# Patient Record
Sex: MALE | Race: WHITE | Employment: FULL TIME | URBAN - METROPOLITAN AREA
[De-identification: names, ages, dates, MRNs, and addresses within clinical notes are randomized per-mention and may not be internally consistent; named-entity substitution may affect disease eponyms.]

---

## 2017-09-27 ENCOUNTER — APPOINTMENT (OUTPATIENT)
Dept: GENERAL RADIOLOGY | Age: 52
End: 2017-09-27
Attending: EMERGENCY MEDICINE
Payer: COMMERCIAL

## 2017-09-27 ENCOUNTER — HOSPITAL ENCOUNTER (EMERGENCY)
Age: 52
Discharge: HOME | End: 2017-09-27
Attending: EMERGENCY MEDICINE
Payer: COMMERCIAL

## 2017-09-27 VITALS
SYSTOLIC BLOOD PRESSURE: 134 MMHG | BODY MASS INDEX: 25.99 KG/M2 | HEIGHT: 69 IN | WEIGHT: 175.49 LBS | RESPIRATION RATE: 15 BRPM | HEART RATE: 86 BPM | DIASTOLIC BLOOD PRESSURE: 97 MMHG | OXYGEN SATURATION: 96 %

## 2017-09-27 DIAGNOSIS — I48.91 ATRIAL FIBRILLATION WITH RVR (HCC): Primary | ICD-10-CM

## 2017-09-27 LAB
ALBUMIN SERPL-MCNC: 4.1 G/DL (ref 3.5–5)
ALBUMIN/GLOB SERPL: 1.1 {RATIO} (ref 1.1–2.2)
ALP SERPL-CCNC: 67 U/L (ref 45–117)
ALT SERPL-CCNC: 46 U/L (ref 12–78)
ANION GAP SERPL CALC-SCNC: 9 MMOL/L (ref 5–15)
AST SERPL-CCNC: 26 U/L (ref 15–37)
ATRIAL RATE: 79 BPM
ATRIAL RATE: 93 BPM
BASOPHILS # BLD: 0.1 K/UL (ref 0–0.1)
BASOPHILS NFR BLD: 1 % (ref 0–1)
BILIRUB SERPL-MCNC: 0.7 MG/DL (ref 0.2–1)
BUN SERPL-MCNC: 14 MG/DL (ref 6–20)
BUN/CREAT SERPL: 16 (ref 12–20)
CALCIUM SERPL-MCNC: 9.6 MG/DL (ref 8.5–10.1)
CALCULATED P AXIS, ECG09: 104 DEGREES
CALCULATED R AXIS, ECG10: -40 DEGREES
CALCULATED R AXIS, ECG10: 107 DEGREES
CALCULATED T AXIS, ECG11: 111 DEGREES
CALCULATED T AXIS, ECG11: 51 DEGREES
CHLORIDE SERPL-SCNC: 103 MMOL/L (ref 97–108)
CK SERPL-CCNC: 112 U/L (ref 39–308)
CO2 SERPL-SCNC: 26 MMOL/L (ref 21–32)
CREAT SERPL-MCNC: 0.86 MG/DL (ref 0.7–1.3)
DIAGNOSIS, 93000: NORMAL
DIAGNOSIS, 93000: NORMAL
EOSINOPHIL # BLD: 0.5 K/UL (ref 0–0.4)
EOSINOPHIL NFR BLD: 5 % (ref 0–7)
ERYTHROCYTE [DISTWIDTH] IN BLOOD BY AUTOMATED COUNT: 12.9 % (ref 11.5–14.5)
GLOBULIN SER CALC-MCNC: 3.7 G/DL (ref 2–4)
GLUCOSE SERPL-MCNC: 118 MG/DL (ref 65–100)
HCT VFR BLD AUTO: 47.3 % (ref 36.6–50.3)
HGB BLD-MCNC: 17.1 G/DL (ref 12.1–17)
INR PPP: 1.1 (ref 0.9–1.1)
LYMPHOCYTES # BLD: 2.9 K/UL (ref 0.8–3.5)
LYMPHOCYTES NFR BLD: 29 % (ref 12–49)
MAGNESIUM SERPL-MCNC: 2.1 MG/DL (ref 1.6–2.4)
MCH RBC QN AUTO: 33 PG (ref 26–34)
MCHC RBC AUTO-ENTMCNC: 36.2 G/DL (ref 30–36.5)
MCV RBC AUTO: 91.3 FL (ref 80–99)
MONOCYTES # BLD: 1.2 K/UL (ref 0–1)
MONOCYTES NFR BLD: 12 % (ref 5–13)
NEUTS SEG # BLD: 5.3 K/UL (ref 1.8–8)
NEUTS SEG NFR BLD: 53 % (ref 32–75)
P-R INTERVAL, ECG05: 172 MS
PLATELET # BLD AUTO: 349 K/UL (ref 150–400)
POTASSIUM SERPL-SCNC: 4.2 MMOL/L (ref 3.5–5.1)
PROT SERPL-MCNC: 7.8 G/DL (ref 6.4–8.2)
PROTHROMBIN TIME: 10.9 SEC (ref 9–11.1)
Q-T INTERVAL, ECG07: 366 MS
Q-T INTERVAL, ECG07: 390 MS
QRS DURATION, ECG06: 102 MS
QRS DURATION, ECG06: 212 MS
QTC CALCULATION (BEZET), ECG08: 442 MS
QTC CALCULATION (BEZET), ECG08: 638 MS
RBC # BLD AUTO: 5.18 M/UL (ref 4.1–5.7)
SODIUM SERPL-SCNC: 138 MMOL/L (ref 136–145)
TROPONIN I SERPL-MCNC: <0.04 NG/ML
VENTRICULAR RATE, ECG03: 161 BPM
VENTRICULAR RATE, ECG03: 88 BPM
WBC # BLD AUTO: 10 K/UL (ref 4.1–11.1)

## 2017-09-27 PROCEDURE — 92960 CARDIOVERSION ELECTRIC EXT: CPT | Performed by: NURSE PRACTITIONER

## 2017-09-27 PROCEDURE — 93005 ELECTROCARDIOGRAM TRACING: CPT

## 2017-09-27 PROCEDURE — 74011250636 HC RX REV CODE- 250/636: Performed by: EMERGENCY MEDICINE

## 2017-09-27 PROCEDURE — 77030018729 HC ELECTRD DEFIB PAD CARD -B

## 2017-09-27 PROCEDURE — 85610 PROTHROMBIN TIME: CPT | Performed by: EMERGENCY MEDICINE

## 2017-09-27 PROCEDURE — 96361 HYDRATE IV INFUSION ADD-ON: CPT

## 2017-09-27 PROCEDURE — 74011000250 HC RX REV CODE- 250: Performed by: EMERGENCY MEDICINE

## 2017-09-27 PROCEDURE — 84484 ASSAY OF TROPONIN QUANT: CPT | Performed by: EMERGENCY MEDICINE

## 2017-09-27 PROCEDURE — 93325 DOPPLER ECHO COLOR FLOW MAPG: CPT

## 2017-09-27 PROCEDURE — 74011250636 HC RX REV CODE- 250/636

## 2017-09-27 PROCEDURE — 96365 THER/PROPH/DIAG IV INF INIT: CPT

## 2017-09-27 PROCEDURE — 36415 COLL VENOUS BLD VENIPUNCTURE: CPT | Performed by: EMERGENCY MEDICINE

## 2017-09-27 PROCEDURE — 96376 TX/PRO/DX INJ SAME DRUG ADON: CPT

## 2017-09-27 PROCEDURE — 71010 XR CHEST PORT: CPT

## 2017-09-27 PROCEDURE — 85025 COMPLETE CBC W/AUTO DIFF WBC: CPT | Performed by: EMERGENCY MEDICINE

## 2017-09-27 PROCEDURE — 80053 COMPREHEN METABOLIC PANEL: CPT | Performed by: EMERGENCY MEDICINE

## 2017-09-27 PROCEDURE — 74011000250 HC RX REV CODE- 250

## 2017-09-27 PROCEDURE — 83735 ASSAY OF MAGNESIUM: CPT | Performed by: EMERGENCY MEDICINE

## 2017-09-27 PROCEDURE — 74011250637 HC RX REV CODE- 250/637: Performed by: INTERNAL MEDICINE

## 2017-09-27 PROCEDURE — 99285 EMERGENCY DEPT VISIT HI MDM: CPT

## 2017-09-27 PROCEDURE — 74011000250 HC RX REV CODE- 250: Performed by: INTERNAL MEDICINE

## 2017-09-27 PROCEDURE — 99152 MOD SED SAME PHYS/QHP 5/>YRS: CPT

## 2017-09-27 PROCEDURE — 82550 ASSAY OF CK (CPK): CPT | Performed by: EMERGENCY MEDICINE

## 2017-09-27 PROCEDURE — 96360 HYDRATION IV INFUSION INIT: CPT

## 2017-09-27 RX ORDER — LIDOCAINE HYDROCHLORIDE 20 MG/ML
15 SOLUTION OROPHARYNGEAL ONCE
Status: COMPLETED | OUTPATIENT
Start: 2017-09-27 | End: 2017-09-27

## 2017-09-27 RX ORDER — DILTIAZEM HYDROCHLORIDE EXTENDED-RELEASE TABLETS 240 MG/1
240 TABLET, EXTENDED RELEASE ORAL DAILY
Qty: 30 TAB | Refills: 11 | Status: SHIPPED | OUTPATIENT
Start: 2017-09-27

## 2017-09-27 RX ORDER — FENTANYL CITRATE 50 UG/ML
INJECTION, SOLUTION INTRAMUSCULAR; INTRAVENOUS
Status: COMPLETED
Start: 2017-09-27 | End: 2017-09-27

## 2017-09-27 RX ORDER — DILTIAZEM HYDROCHLORIDE 5 MG/ML
0.25 INJECTION INTRAVENOUS
Status: COMPLETED | OUTPATIENT
Start: 2017-09-27 | End: 2017-09-27

## 2017-09-27 RX ORDER — FENTANYL CITRATE 50 UG/ML
25-50 INJECTION, SOLUTION INTRAMUSCULAR; INTRAVENOUS
Status: DISCONTINUED | OUTPATIENT
Start: 2017-09-27 | End: 2017-09-27 | Stop reason: HOSPADM

## 2017-09-27 RX ORDER — LIDOCAINE HYDROCHLORIDE 20 MG/ML
SOLUTION OROPHARYNGEAL
Status: COMPLETED
Start: 2017-09-27 | End: 2017-09-27

## 2017-09-27 RX ORDER — MIDAZOLAM HYDROCHLORIDE 1 MG/ML
.5-2 INJECTION, SOLUTION INTRAMUSCULAR; INTRAVENOUS
Status: DISCONTINUED | OUTPATIENT
Start: 2017-09-27 | End: 2017-09-27 | Stop reason: HOSPADM

## 2017-09-27 RX ORDER — MIDAZOLAM HYDROCHLORIDE 1 MG/ML
INJECTION, SOLUTION INTRAMUSCULAR; INTRAVENOUS
Status: COMPLETED
Start: 2017-09-27 | End: 2017-09-27

## 2017-09-27 RX ADMIN — FENTANYL CITRATE 50 MCG: 50 INJECTION, SOLUTION INTRAMUSCULAR; INTRAVENOUS at 13:37

## 2017-09-27 RX ADMIN — DEXTROSE MONOHYDRATE 2.5 MG/HR: 50 INJECTION, SOLUTION INTRAVENOUS at 11:32

## 2017-09-27 RX ADMIN — MIDAZOLAM HYDROCHLORIDE 2 MG: 1 INJECTION, SOLUTION INTRAMUSCULAR; INTRAVENOUS at 13:38

## 2017-09-27 RX ADMIN — BENZOCAINE, BUTAMBEN, AND TETRACAINE HYDROCHLORIDE 1 SPRAY: .028; .004; .004 AEROSOL, SPRAY TOPICAL at 13:22

## 2017-09-27 RX ADMIN — MIDAZOLAM HYDROCHLORIDE 1 MG: 1 INJECTION, SOLUTION INTRAMUSCULAR; INTRAVENOUS at 13:40

## 2017-09-27 RX ADMIN — MIDAZOLAM HYDROCHLORIDE 2 MG: 1 INJECTION, SOLUTION INTRAMUSCULAR; INTRAVENOUS at 13:21

## 2017-09-27 RX ADMIN — LIDOCAINE HYDROCHLORIDE 15 ML: 20 SOLUTION OROPHARYNGEAL at 13:21

## 2017-09-27 RX ADMIN — DILTIAZEM HYDROCHLORIDE 20 MG: 5 INJECTION INTRAVENOUS at 09:26

## 2017-09-27 RX ADMIN — MIDAZOLAM HYDROCHLORIDE 1 MG: 1 INJECTION, SOLUTION INTRAMUSCULAR; INTRAVENOUS at 13:23

## 2017-09-27 RX ADMIN — SODIUM CHLORIDE 1000 ML: 900 INJECTION, SOLUTION INTRAVENOUS at 09:20

## 2017-09-27 RX ADMIN — FENTANYL CITRATE 50 MCG: 50 INJECTION, SOLUTION INTRAMUSCULAR; INTRAVENOUS at 13:20

## 2017-09-27 RX ADMIN — RIVAROXABAN 20 MG: 20 TABLET, FILM COATED ORAL at 14:07

## 2017-09-27 RX ADMIN — LIDOCAINE HYDROCHLORIDE 15 ML: 20 SOLUTION ORAL; TOPICAL at 13:21

## 2017-09-27 RX ADMIN — MIDAZOLAM HYDROCHLORIDE 1 MG: 1 INJECTION, SOLUTION INTRAMUSCULAR; INTRAVENOUS at 13:25

## 2017-09-27 NOTE — PROGRESS NOTES
Successful ROSLYN and cardioversion to SR  Start Dilt ER 240mg daily  Start Xarelto 20mg daily     Follow up with Dr. Suhail Sam 10/17/17

## 2017-09-27 NOTE — DISCHARGE INSTRUCTIONS
[address] [address]  [phone]      ROSLYN and Cardioversion Discharge Instructions    Patient ID:  Dawood Pathak  [id number]  14 y.o.  1965    Date: 9/27/2017    Attending Physician: No admitting provider for patient encounter. Admission Diagnoses: There are no admission diagnoses documented for this encounter. Discharge Diagnoses:   Principal Problem:    Atrial fibrillation with rapid ventricular response ([address] (4/20/2015)      Overview: 7/2015 PVI afib ablation      4/21/15 ROSLYN with successful cardioversion to SR        Discharge Condition: Good    Cardiology Procedures this Admission:  ROSLYN/Cardioversion    Disposition: home    Reference discharge instructions provided by nursing for diet and activity. Signed:  Raegan Lua NP  9/27/2017  3:12 PM      ROSLYN and Cardioversion: After Your Visit     Your Care Instructions    After your ROSLYN and cardioversion, do not eat or drink for 2 hours. You have received a mild anesthesia and numbing medicine in your throat. After these 2 hours, start with sips of water. If you are able to swallow water easily, try something soft (bananna, applesauce). If your throat feels normal, you may resume your regular diet. Do not drive for the rest of the day or as instructed by the physician. Notify your physician if you have any problems after the test: swelling of your tongue or throat, shortness of breath or coughing up blood. Cardioversion is a treatment for an abnormal heartbeat, such as atrial fibrillation. In cardioversion, a brief electric shock is given to the heart to reset its rhythm. The shock comes through metal paddles or patches placed on the outside of your chest. Cardioversion usually restores the heartbeat to normal.    After the procedure, you may have redness, like a sunburn, where the paddles were. Do not drive until the day after a cardioversion.  You can eat and drink when you feel ready to. Your doctor may have you take medicines daily to help the heart beat normally and to prevent blood clots. Your doctor may prescribe medicine before and after cardioversion to help keep your heart in a normal rhythm after the procedure. Instead of electric cardioversion, your doctor may try to convert your heart to a normal rhythm using medicine given through a vein. This is usually done in a clinic or hospital.    Follow-up care is a key part of your treatment and safety. Be sure to make and go to all appointments, and call your doctor if you are having problems. Its also a good idea to know your test results and keep a list of the medicines you take. How can you care for yourself at home? Medicines  Take your medicines exactly as prescribed. Call your doctor if you think you are having a problem with your medicine. You may take some of the following medicines:  Beta-blockers such as propranolol (Inderal), metoprolol (Lopressor), or carvedilol (Coreg). Calcium channel blockers such as diltiazem (Cardizem) or verapamil (Calan). Digoxin (Lanoxin). Antiarrhythmic medicines such as amiodarone (Cordarone), procainamide, or flecainide (Tambocor). You will get more details on the specific medicines your doctor prescribes. If your doctor has given you blood thinners such as warfarin (Coumadin), enoxaparin (Lovenox), or aspirin to prevent blood clots, be sure to: Take your medicine at the same time each day. Talk with your doctor before you make any major changes to your diet or start a weight loss plan. The foods that you eat can affect how well blood thinners work for you. Tell your dentist, pharmacist, and other health professionals that you take blood thinners. Watch for unusual bruising or bleeding, such as blood in urine, maroon or black stools, or bleeding from the nose or gums. Get regular blood tests to check how fast your blood clots, if you are taking Coumadin.   Wear medical alert jewelry that says you take blood thinners. You can buy this at most [employer]. Do not take any vitamins, over-the-counter medicines, or herbal products without talking to your doctor first.    Lifestyle changes  Do not smoke. Smoking increases your risk of stroke and heart attack. If you need help quitting, talk to your doctor about stop-smoking programs and medicines. These can increase your chances of quitting for good. Limit alcohol to 2 drinks a day for men and 1 drink a day for women. Alcohol may interfere with blood thinners. It also increases your risk of falls, which can cause bruising and bleeding. Limit or avoid caffeine (coffee, tea, and cola drinks) and other stimulants (decongestants, over-the-counter cold and flu medicines, and illegal substances) if your doctor says to. They can trigger heart problems. If you are taking blood thinners, avoid sports or activities that could lead to injury. Make your home safe and take other measures to reduce your risk of falling. Always wear a seat belt while in a car. Activity  If your doctor recommends it, get more exercise. Walking is a good choice. Bit by bit, increase the amount you walk every day. Try for 30 minutes on most days of the week. You also may want to swim, bike, or do other activities. When you exercise, watch for signs that your heart is working too hard. You are pushing too hard if you cannot talk while you are exercising. If you become short of breath or dizzy or have chest pain, sit down and rest immediately. If your doctor has not set you up with a cardiac rehabilitation program, talk to him or her about whether that is right for you. Cardiac rehab includes supervised exercise, help with diet and lifestyle changes, and emotional support. It may reduce your risk of future heart problems. Check your pulse regularly. Place two fingers on the artery at the palm side of your wrist in line with your thumb.  If your heartbeat seems uneven or fast, talk to your doctor. When should you call for help? Call 911 anytime you think you may need emergency care. For example, call if:  You have severe trouble breathing. You cough up pink, foamy mucus and you have trouble breathing. You have symptoms of a heart attack such as:  Chest pain or pressure. Sweating. Shortness of breath. Nausea or vomiting. Pain that spreads from the chest to the neck, jaw, or one or both shoulders or arms. Dizziness or lightheadedness. A fast or uneven pulse. After calling 911, chew 1 adult-strength aspirin. Wait for an ambulance. Do not try to drive yourself. You have signs of a stroke. These may include:  Sudden numbness, paralysis, or weakness in your face, arm, or leg, especially on only one side of your body. New problems with walking or balance. Sudden vision changes. Drooling or slurred speech. New problems speaking or understanding simple statements, or feeling confused. A sudden, severe headache that is different from past headaches. You cough up blood. You vomit blood or what looks like coffee grounds. You pass maroon or very bloody stools. You passed out (lost consciousness). Call your doctor now or seek immediate medical care if:  You have new or increased shortness of breath. You are dizzy or lightheaded, or you feel like you may faint. You have sudden weight gain, such as 3 pounds or more in 2 to 3 days. You have increased swelling in your legs, ankles, or feet. You have new bruises or blood spots under your skin. You have a nosebleed. Your gums bleed when you brush your teeth. You have blood in your urine. Your stools are black and tarlike or have streaks of blood. You have vaginal bleeding when you are not having your period, or heavy period bleeding. Watch closely for changes in your health, and be sure to contact your doctor if you have any problems. Where can you learn more? Go to [employer].     Enter N918 in the search box to learn more about \"Cardioversion: After Your Visit. \"    © 2712-3947 [employer]. Care instructions adapted under license by [employer] (which disclaims liability or warranty for this information). This care instruction is for use with your licensed healthcare professional. If you have questions about a medical condition or this instruction, always ask your healthcare professional. Tania Mcknight any warranty or liability for your use of this information.

## 2017-09-27 NOTE — CONSULTS
[id number] [facility] [address] [facility]     Date of  Admission: 9/27/2017  9:02 AM     Admission type:Emergency    Consult for: afib with RVR  Consult by: ED MD     Subjective:     Kevin Bedoya is a 46 y.o. male admitted for afib with RVR. Previous PVI afib ablation in 7/2015, Link implanted. Has not been seen since 2015, and stopped checking ILR 10/2016. Stopped taking Diltiazem and ASA over 1.5 years ago. Had felt well no palpitations until 3-4 weeks ago when he noticed palpitations/afib with exertion, but resolved with rest.  Increased in frequency, until this AM when he awoke with afib which did not resolve. He states overall felt lousy, but denies CP, SOB, dizziness/lightheadedness. ECG afib with RVR, troponin neg. Received IV Dilt 20mg push with slowing of rate to 80-90s, remained in afib. Now started on IV Dilt for rate control. Previous treatment/evaluation includes echocardiogram and PVI afib ablation  . Cardiac risk factors: male gender, hypertension.       Patient Active Problem List    Diagnosis Date Noted    Hypertension     S/P ablation of atrial fibrillation 07/02/2015    Uncontrolled hypertension 04/21/2015    Atrial fibrillation with rapid ventricular response ([address] 04/20/2015      None  Past Medical History:   Diagnosis Date    [facility]fib [facility]     CAD (coronary artery disease)     a fib    Hypertension     S/P ablation of atrial fibrillation 7/2/2015    Uncontrolled hypertension 4/21/2015      Social History     Social History    Marital status:      Spouse name: N/A    Number of children: N/A    Years of education: N/A     Social History Main Topics    Smoking status: Never Smoker    Smokeless tobacco: Never Used    Alcohol use 4.2 oz/week     7 Glasses of wine per week      Comment: several drinks a day    Drug use: No    Sexual activity: Not Asked     Other Topics Concern    None     Social History Narrative     No Known Allergies   Family History   Problem Relation Age of Onset    Family history unknown: Yes      Current Facility-Administered Medications   Medication Dose Route Frequency    dilTIAZem (CARDIZEM) 100 mg in dextrose 5% (MBP/ADV) 100 mL infusion  0-15 mg/hr IntraVENous TITRATE    butamben-tetracaine-benzocaine (CETACAINE) 2 %-2 %-14 % (200 mg/sec) topical spray 1 Spray  1 Spray Topical ONCE    fentaNYL citrate (PF) injection 25-50 mcg  25-50 mcg IntraVENous Multiple    lidocaine (XYLOCAINE) 2 % viscous solution 15 mL  15 mL Mouth/Throat ONCE    midazolam (VERSED) injection 0.5-2 mg  0.5-2 mg IntraVENous Multiple    midazolam (VERSED) 1 mg/mL injection        lidocaine (XYLOCAINE) 2 % viscous solution        fentaNYL citrate (PF) 50 mcg/mL injection         Current Outpatient Prescriptions   Medication Sig    ASPIRIN/ACETAMINOPHEN (GOODY'S BODY PAIN PO) Take 1 Packet by mouth daily as needed for Pain.  MULTIVITAMIN PO Take 1 Tab by mouth daily.         Review of Symptoms:   Constitutional: negative  Eyes: negative   Ears, nose, mouth, throat, and face: negative  Respiratory: negative   Cardiovascular: increase in palpitations last 3-4  weeks  Gastrointestinal: negative  Genitourinary:negative   Musculoskeletal:negative   Neurological: negative   Endocrine: negative          Objective:      Visit Vitals    BP (!) 133/93 (BP 1 Location: Left arm)    Pulse 93    Resp 16    Ht 5' 9\" (1.753 m)    Wt 79.6 kg (175 lb 7.8 oz)    SpO2 94%    BMI 25.91 kg/m2       Physical:   General: WNWD  male in no acute distress  Heart: irr,irr, no m/S3/JVD, no carotid bruits   Lungs: clear   Abdomen: Soft, +BS, NTND   Extremities: LE ana +DP/PT, no edema   Neurologic: Grossly normal    Data Review:   Recent Labs      09/27/17   0911   WBC  10.0   HGB  17.1*   HCT  47.3   PLT  349     Recent Labs      09/27/17   0911   NA  138   K  4.2   CL  103   CO2  26   GLU  118* BUN  14   CREA  0.86   CA  9.6   MG  2.1   ALB  4.1   TBILI  0.7   SGOT  26   ALT  46   INR  1.1       Recent Labs      09/27/17   0911   TROIQ  <0.04       No intake or output data in the 24 hours ending 09/27/17 1319     Cardiographics    Telemetry: afib with RVR  ECG: afib with RVR     CXRAY: no acute changes       Assessment:       Principal Problem:    Atrial fibrillation with rapid ventricular response ([address] (4/20/2015)      Overview: 7/2015 PVI afib ablation      4/21/15 ROSLYN with successful cardioversion to SR         Plan:     Afib with RVR:  Rate control with IV Dilt  Recommend ROSLYN and cardioversion today  [id number] score: 1, start on ASA daily  Transition to PO Dilt XR once converted to SR  Long discussion with patient reference compliance with medications and followup appts  Schedule f/u with Dr. Lico Owens, EP to discuss further management of afib, likely repeat PVI afib ablation    Thank you for consulting DL Quinonez NP       Pt seen and examined in details. Agree with NP A&P. I discussed the risks/benefits/alternatives of the procedure with the patient. The patient understands and agrees to proceed.     Kevyn Fuentes MD

## 2017-09-27 NOTE — ED PROVIDER NOTES
HPI Comments: Kevin Bedoya is a 46 y.o. male with PMhx significant for HTN, CAD, atrial fibrillation, elective cardioversion, and loop recorder placement who presents ambulatory to the ED with cc of sudden onset palpitations and chest pressure beginning [id number]. He indicates he get out of bed to use the restroom at that time and reports the palpitations have persisted ever since. Pt reports SOB at onset of current symptoms, but states this has since improved upon arrival to the ED. He notes intermittent episodes of atrial fibrillation that last ~10 seconds and resolve on their own since his cardioversion in 2015, but indicates he is not currently on any medications for afib with RVR. Pt reports he has felt asymptomatic over the past several days. He reports EtOH use, but denies tobacco and illicit drug use. Pt denies known hx of SVT. Pt specifically denies any chest pain, N/V/D, abdominal pain, fever, chills, and cough. Cardiology: Carli Contreras MD  PCP: None    There are no other complaints, changes or physical findings at this time. The history is provided by the patient. Past Medical History:   Diagnosis Date    A-fib [facility])     CAD (coronary artery disease)     a fib    Hypertension     S/P ablation of atrial fibrillation 7/2/2015    Uncontrolled hypertension 4/21/2015       Past Surgical History:   Procedure Laterality Date    CARDIAC SURG PROCEDURE UNLIST      Cauterized parts of his heart    CARDIOVERSION, ELECTIVE  4/21/2015              Family History:   Problem Relation Age of Onset    Family history unknown: Yes       Social History     Social History    Marital status:      Spouse name: N/A    Number of children: N/A    Years of education: N/A     Occupational History    Not on file.      Social History Main Topics    Smoking status: Never Smoker    Smokeless tobacco: Never Used    Alcohol use 4.2 oz/week     7 Glasses of wine per week      Comment: several drinks a day    Drug use: No    Sexual activity: Not on file     Other Topics Concern    Not on file     Social History Narrative         ALLERGIES: Review of patient's allergies indicates no known allergies. Review of Systems   Constitutional: Negative for chills, fatigue and fever. HENT: Negative for congestion, rhinorrhea and sore throat. Eyes: Negative for pain, discharge and visual disturbance. Respiratory: Positive for shortness of breath. Negative for cough, chest tightness and wheezing. Cardiovascular: Positive for palpitations. Negative for chest pain and leg swelling.        + chest pressure   Gastrointestinal: Negative for abdominal pain, constipation, diarrhea, nausea and vomiting. Genitourinary: Negative for dysuria, frequency and hematuria. Musculoskeletal: Negative for arthralgias, back pain and myalgias. Skin: Negative for rash. Neurological: Negative for dizziness, weakness, light-headedness and headaches. Psychiatric/Behavioral: Negative.       Patient Vitals for the past 12 hrs:   Pulse Resp BP SpO2   09/27/17 1408 82 16 110/79 97 %   09/27/17 1345 83 16 120/80 98 %   09/27/17 1338 (!) 102 16 135/89 97 %   09/27/17 1329 (!) 112 16 135/89 96 %   09/27/17 1314 93 16 (!) 133/93 94 %   09/27/17 1257 (!) 115 18 (!) 136/105 98 %   09/27/17 1245 (!) 135 14 (!) 127/93 96 %   09/27/17 1230 (!) 125 13 (!) 132/93 96 %   09/27/17 1215 (!) 130 14 (!) 138/92 95 %   09/27/17 1200 (!) 135 14 (!) 128/96 94 %   09/27/17 1151 (!) 136 11 144/87 96 %   09/27/17 1137 (!) 136 14 141/90 96 %   09/27/17 1130 (!) 136 16 143/88 96 %   09/27/17 1125 (!) 134 16 131/83 96 %   09/27/17 1108 (!) 128 11 142/90 96 %   09/27/17 1100 (!) 129 17 (!) 133/93 97 %   09/27/17 1055 (!) 129 16 (!) 126/99 96 %   09/27/17 0945 96 12 124/88 97 %   09/27/17 0942 91 11 (!) 128/92 96 %   09/27/17 0930 85 12 117/75 99 %   09/27/17 0925 (!) 140 13 (!) 137/107 97 %   09/27/17 0855 (!) 125 15 119/89 98 %            Physical Exam   Constitutional: He is oriented to person, place, and time. He appears well-developed and well-nourished. No distress. HENT:   Head: Normocephalic and atraumatic. Eyes: EOM are normal. Right eye exhibits no discharge. Left eye exhibits no discharge. No scleral icterus. Neck: Normal range of motion. Neck supple. No tracheal deviation present. Cardiovascular: Normal heart sounds and intact distal pulses. An irregular rhythm present. Tachycardia present. Exam reveals no gallop and no friction rub. No murmur heard. Pulmonary/Chest: Effort normal and breath sounds normal. No respiratory distress. He has no wheezes. He has no rales. Abdominal: Soft. He exhibits no distension. There is no tenderness. Musculoskeletal: Normal range of motion. He exhibits no edema. Lymphadenopathy:     He has no cervical adenopathy. Neurological: He is alert and oriented to person, place, and time. Skin: Skin is warm and dry. No rash noted. Psychiatric: He has a normal mood and affect. Nursing note and vitals reviewed. MDM  Number of Diagnoses or Management Options  Atrial fibrillation with RVR [facility]):   Diagnosis management comments:     Patient presents to ED with atrial fibrillation with RVR. Differential includes arryhthmia, atypical chest pain, stable angina, unstable angina, MI, PE, pleurisy, costochondritis, pneumonia, bronchitis, MSK pain.   Do not suspect dissection.  - CBC, CMP, Mg, Cabrera  - CXR  - Diltiazem IV  - Cardiology consult           Amount and/or Complexity of Data Reviewed  Clinical lab tests: ordered and reviewed  Tests in the radiology section of CPT®: ordered and reviewed  Tests in the medicine section of CPT®: ordered and reviewed  Review and summarize past medical records: yes  Discuss the patient with other providers: yes (Cardiology)  Independent visualization of images, tracings, or specimens: yes    Critical Care  Total time providing critical care: 30-74 minutes    ED Course Procedures    EKG interpretation: (Preliminary)  0850  Rhythm: atrial fib with RVR and LBBB; and irregular. Rate (approx.): 161; Axis: normal; QRS interval: prolonged; ST/T wave: nonspecific ST/T wave changes  Written by NAT Celaya, as dictated by Esperanza López MD    EKG interpretation: (Repeat)  0932  Rhythm: atrial fib; and irregular and LBBB. Rate (approx.): 88; Axis: left axis deviation; QRS interval: normal ; ST/T wave: nonspecific T wave abnormality  Written by NAT Celayaibe, as dictated by Esperanza López MD    PROGRESS NOTE:  9:32 AM  Heart rate is improving after IV diltiazem. Will continue to monitor. Written by NAT Celaya, as dictated by Esperanza López MD.    PROGRESS NOTE:  10:12 AM  Pt's heart rate is increasing to one-teens/one-twenties. Will start diltiazem drip and consult cardiology. Written by NAT Celaya, as dictated by Esperanza López MD.    CONSULT NOTE:   10:31 AM  Esperanza López MD spoke with Evans Ramirez MD   Specialty: cardiology  Discussed pt's hx, disposition, and available diagnostic and imaging results. Reviewed care plans. Consultant agrees with plans as outlined. Dr. Sabino Rodriguez agrees with dilt drip and he will evaluate for admission. Written by NAT Celayaibjesika, as dictated by Esperanza López MD.      CRITICAL CARE NOTE :    12:29 PM    IMPENDING DETERIORATION -Cardiovascular    ASSOCIATED RISK FACTORS - Dysrhythmia    MANAGEMENT- Bedside Assessment and Supervision of Care    INTERPRETATION -  ECG and Blood Pressure    INTERVENTIONS - Diltiazem bolus and drip    CASE REVIEW - Medical Sub-Specialist and Nursing    TREATMENT RESPONSE -Unchanged     PERFORMED BY - Self    NOTES   :    I have spent 45 minutes of critical care time involved in lab review, consultations with specialist, family decision- making, bedside attention and documentation.  During this entire length of time I was immediately available to the patient . Smooth Beard MD    PROGRESS NOTE:  3:36 PM  Cardiology cardioverted pt to normal sinus rhythm. They state he is stable for discharge and have sent his prescriptions to his pharmacy. Written by Nellie Murray ED Scribe, as dictated by Smooth Beard MD.    PROGRESS NOTE:  3:45 PM  Patient is feeling well and denies any complaints. Will discharge with cardiology follow up. Discussed results, prescriptions and follow up plan with patient. Provided customary return to ED instructions. Patient expressed understanding. Dacia Cristobal MD    LABORATORY TESTS:  Recent Results (from the past 12 hour(s))   EKG, 12 LEAD, INITIAL    Collection Time: 09/27/17  8:50 AM   Result Value Ref Range    Ventricular Rate 161 BPM    Atrial Rate 79 BPM    P-R Interval 172 ms    QRS Duration 212 ms    Q-T Interval 390 ms    QTC Calculation (Bezet) 638 ms    Calculated P Axis 104 degrees    Calculated R Axis 107 degrees    Calculated T Axis 111 degrees    Diagnosis       ** Suspect arm lead reversal, interpretation assumes no reversal  Undetermined rhythm  Nonspecific intraventricular block  Lateral infarct , age undetermined  When compared with ECG of 02-JUL-2015 07:27,  Significant changes have occurred     CBC WITH AUTOMATED DIFF    Collection Time: 09/27/17  9:11 AM   Result Value Ref Range    WBC 10.0 4.1 - 11.1 K/uL    RBC 5.18 4.10 - 5.70 M/uL    HGB 17.1 (H) 12.1 - 17.0 g/dL    HCT 47.3 36.6 - 50.3 %    MCV 91.3 80.0 - 99.0 FL    MCH 33.0 26.0 - 34.0 PG    MCHC 36.2 30.0 - 36.5 g/dL    RDW 12.9 11.5 - 14.5 %    PLATELET 285 070 - 551 K/uL    NEUTROPHILS 53 32 - 75 %    LYMPHOCYTES 29 12 - 49 %    MONOCYTES 12 5 - 13 %    EOSINOPHILS 5 0 - 7 %    BASOPHILS 1 0 - 1 %    ABS. NEUTROPHILS 5.3 1.8 - 8.0 K/UL    ABS. LYMPHOCYTES 2.9 0.8 - 3.5 K/UL    ABS. MONOCYTES 1.2 (H) 0.0 - 1.0 K/UL    ABS. EOSINOPHILS 0.5 (H) 0.0 - 0.4 K/UL    ABS.  BASOPHILS 0.1 0.0 - 0.1 K/UL METABOLIC PANEL, COMPREHENSIVE    Collection Time: 09/27/17  9:11 AM   Result Value Ref Range    Sodium 138 136 - 145 mmol/L    Potassium 4.2 3.5 - 5.1 mmol/L    Chloride 103 97 - 108 mmol/L    CO2 26 21 - 32 mmol/L    Anion gap 9 5 - 15 mmol/L    Glucose 118 (H) 65 - 100 mg/dL    BUN 14 6 - 20 MG/DL    Creatinine 0.86 0.70 - 1.30 MG/DL    BUN/Creatinine ratio 16 12 - 20      GFR est AA >60 >60 ml/min/1.73m2    GFR est non-AA >60 >60 ml/min/1.73m2    Calcium 9.6 8.5 - 10.1 MG/DL    Bilirubin, total 0.7 0.2 - 1.0 MG/DL    ALT (SGPT) 46 12 - 78 U/L    AST (SGOT) 26 15 - 37 U/L    Alk.  phosphatase 67 45 - 117 U/L    Protein, total 7.8 6.4 - 8.2 g/dL    Albumin 4.1 3.5 - 5.0 g/dL    Globulin 3.7 2.0 - 4.0 g/dL    A-G Ratio 1.1 1.1 - 2.2     PROTHROMBIN TIME + INR    Collection Time: 09/27/17  9:11 AM   Result Value Ref Range    INR 1.1 0.9 - 1.1      Prothrombin time 10.9 9.0 - 11.1 sec   TROPONIN I    Collection Time: 09/27/17  9:11 AM   Result Value Ref Range    Troponin-I, Qt. <0.04 <0.05 ng/mL   CK W/ REFLX CKMB    Collection Time: 09/27/17  9:11 AM   Result Value Ref Range     39 - 308 U/L   MAGNESIUM    Collection Time: 09/27/17  9:11 AM   Result Value Ref Range    Magnesium 2.1 1.6 - 2.4 mg/dL   EKG, 12 LEAD, SUBSEQUENT    Collection Time: 09/27/17  9:32 AM   Result Value Ref Range    Ventricular Rate 88 BPM    Atrial Rate 93 BPM    QRS Duration 102 ms    Q-T Interval 366 ms    QTC Calculation (Bezet) 442 ms    Calculated R Axis -40 degrees    Calculated T Axis 51 degrees    Diagnosis       Atrial fibrillation with premature ventricular or aberrantly conducted   complexes  Left axis deviation  Minimal voltage criteria for LVH, may be normal variant  Inferior infarct , age undetermined  Anterior infarct , age undetermined  Abnormal ECG  When compared with ECG of 27-SEP-2017 08:50,  MANUAL COMPARISON REQUIRED, DATA IS UNCONFIRMED     EKG, 12 LEAD, INITIAL    Collection Time: 09/27/17  1:52 PM   Result Value Ref Range    Ventricular Rate 85 BPM    Atrial Rate 85 BPM    P-R Interval 180 ms    QRS Duration 116 ms    Q-T Interval 384 ms    QTC Calculation (Bezet) 456 ms    Calculated P Axis 59 degrees    Calculated R Axis -39 degrees    Calculated T Axis 66 degrees    Diagnosis       Normal sinus rhythm  Left axis deviation  Anteroseptal infarct , possibly acute  ** ACUTE MI **  When compared with ECG of 27-SEP-2017 09:32,  MANUAL COMPARISON REQUIRED, DATA IS UNCONFIRMED         IMAGING RESULTS:  CXR Results  (Last 48 hours)               09/27/17 0955  XR CHEST PORT Final result    Impression:  IMPRESSION: No acute abnormality. Narrative:  EXAM:  XR CHEST PORT. INDICATION: Palpitations. COMPARISON: 4/20/2015. FINDINGS:    A portable AP radiograph of the chest was obtained at 0934 hours. The   positioning is lordotic and there is a small monitoring device implanted in the   left chest.   Lines and tubes: The patient is on a cardiac monitor. Lungs: The lungs are clear. Pleura: There is no pneumothorax or pleural effusion. Mediastinum: The cardiac and mediastinal contours and pulmonary vascularity are   normal.   Bones and soft tissues: The bones and soft tissues are grossly within normal   limits. MEDICATIONS GIVEN:  Medications   fentaNYL citrate (PF) injection 25-50 mcg (50 mcg IntraVENous Given 9/27/17 1337)   midazolam (VERSED) injection 0.5-2 mg (1 mg IntraVENous Given 9/27/17 1340)   sodium chloride 0.9 % bolus infusion 1,000 mL (1,000 mL IntraVENous New Bag 9/27/17 0920)   dilTIAZem (CARDIZEM) injection 20 mg (20 mg IntraVENous Given 9/27/17 0926)   butamben-tetracaine-benzocaine (CETACAINE) 2 %-2 %-14 % (200 mg/sec) topical spray 1 Spray (1 Spray Topical Given 9/27/17 1322)   lidocaine (XYLOCAINE) 2 % viscous solution 15 mL (15 mL Mouth/Throat Given 9/27/17 1321)   rivaroxaban (XARELTO) tablet 20 mg (20 mg Oral Given 9/27/17 1407)       IMPRESSION:  1.  Atrial fibrillation with RVR ([facility]        PLAN:  1. Discharge home   Current Discharge Medication List      START taking these medications    Details   rivaroxaban (XARELTO) 20 mg tab tablet Take 1 Tab by mouth daily. Qty: 30 Tab, Refills: 6      dilTIAZem ER (CARDIZEM LA) 240 mg Tb24 tablet Take 1 Tab by mouth daily. Qty: 30 Tab, Refills: 11         CONTINUE these medications which have NOT CHANGED    Details   MULTIVITAMIN PO Take 1 Tab by mouth daily. STOP taking these medications       ASPIRIN/ACETAMINOPHEN (GOODY'S BODY PAIN PO) Comments:   Reason for Stoppin.   Follow-up Information     Follow up With Details Comments [address] On 10/17/2017 at 10:30AM Christopherjesika [address]       None   None (395) Patient stated that they have no PCP      [facility] EMERGENCY DEPT  As needed, If symptoms worsen [address]        Return to ED if worse     DISCHARGE NOTE:  3:50 PM  The patient is ready for discharge. The patients signs, symptoms, diagnosis, and instructions for discharge have been discussed and the pt has conveyed their understanding. The patient is to follow up as recommended with PCP or return to the ER should their symptoms worsen. Plan has been discussed and patient has conveyed their agreement. This note is prepared by Claudia Bower, acting as Scribe for Geraldine Gutierrez MD.    Geraldine Gutierrez MD: The scribe's documentation has been prepared under my direction and personally reviewed by me in its entirety. I confirm that the note above accurately reflects all work, treatment, procedures, and medical decision making performed by me.

## 2017-09-27 NOTE — PROCEDURES
[id number]   [address] [facility]  [address]       Name:  Amish Solitario   MR#:  [id number]   :  1965   Account #:  [de-identified]        Date of Adm:  2017       PROCEDURE: Direct-current cardioversion. ANESTHESIA: IV conscious sedation. INDICATIONS: Atrial fibrillation. BRIEF PROCEDURE NOTE: At the completion of ROSLYN, the patient was   successfully cardioverted to sinus rhythm using 360 joules of   synchronized cardioversion. COMPLICATIONS: None.          Doc MD [place]      [address] [place] [address] / Pamela Wagner   D:  2017   13:43   T:  2017   14:07   Job #:  [id number]

## 2017-09-27 NOTE — PROGRESS NOTES
Pharmacy Clarification of Prior to Admission Medication Regimen     The patient was interviewed regarding clarification of the prior to admission medication regimen, and was questioned regarding use of any other inhalers, topical products, over the counter medications, herbal medications, vitamin products or ophthalmic/nasal/otic medication use. Information Obtained From: Patient    Pertinent Pharmacy Findings:   MULTIVITAMIN PO: Patient stated that he takes this OTC agent at home, but was unaware of the strength/brand. Patient stated 'I do not take this [agent] everyday'.  ASPIRIN/ACETAMINOPHEN (GOODY'S BODY PAIN PO): Patient stated that he takes this OTC agent at home, but was unaware of the strength. PTA medication list was corrected to the following:     Prior to Admission Medications   Prescriptions Last Dose Informant Patient Reported? Taking? ASPIRIN/ACETAMINOPHEN (GOODY'S BODY PAIN PO) 9/25/2017 at Unknown time Self Yes Yes   Sig: Take 1 Packet by mouth daily as needed for Pain. MULTIVITAMIN PO 9/26/2017 at Unknown time Self Yes Yes   Sig: Take 1 Tab by mouth daily.       Facility-Administered Medications: None          Thank you,  Molly Beebe, [facility]  Medication History Pharmacy Technician

## 2017-09-27 NOTE — ED TRIAGE NOTES
Got up this morning and he felt like his heart was pounding. Has not been taking his cardizem as he changed insurance.

## 2017-09-27 NOTE — PROGRESS NOTES
Pt arrived to Non-Invasive Lab. Pt identified with 2 patient identifiers. ROSLYN procedure reviewed with patient and questions answered.   ASA score 2  Mallampati score 2  per MD.

## 2017-09-27 NOTE — PROGRESS NOTES
Pt sedated with 50 mcg IV Fentanyl and 4 mg IV Versed for ROSLYN. Pt tolerated well. Pt given an additional 50 mcg IV Fentanyl and 3 mg IV Versed for Elective Cardioversion. Pt given 1 synch shock at 360 joules. Rhythm converted to sinus.

## 2017-09-27 NOTE — PROGRESS NOTES
TRANSFER - OUT REPORT:    Verbal report given to [facility] on [address] [facility]  being transferred back to [facility] for routine progression of care post ROSLYN/Cardioversion      Report consisted of patients Situation, Background, Assessment and   Recommendations(SBAR). Information from the following report(s) Procedure Summary was reviewed with the receiving nurse. Lines:   Peripheral IV 09/27/17 Right Antecubital (Active)   Site Assessment Clean, dry, & intact 9/27/2017  9:09 AM   Phlebitis Assessment 0 9/27/2017  9:09 AM   Infiltration Assessment 0 9/27/2017  9:09 AM   Dressing Status Clean, dry, & intact 9/27/2017  9:09 AM   Dressing Type Transparent 9/27/2017  9:09 AM   Hub Color/Line Status Green 9/27/2017  9:09 AM       Peripheral IV 09/27/17 Left Forearm (Active)   Site Assessment Clean, dry, & intact 9/27/2017 11:24 AM   Phlebitis Assessment 0 9/27/2017 11:24 AM   Infiltration Assessment 0 9/27/2017 11:24 AM   Dressing Status Clean, dry, & intact 9/27/2017 11:24 AM   Dressing Type Tape;Transparent 9/27/2017 11:24 AM   Hub Color/Line Status Pink 9/27/2017 11:24 AM        Opportunity for questions and clarification was provided.

## 2017-09-28 LAB
ATRIAL RATE: 85 BPM
CALCULATED P AXIS, ECG09: 59 DEGREES
CALCULATED R AXIS, ECG10: -39 DEGREES
CALCULATED T AXIS, ECG11: 66 DEGREES
DIAGNOSIS, 93000: NORMAL
P-R INTERVAL, ECG05: 180 MS
Q-T INTERVAL, ECG07: 384 MS
QRS DURATION, ECG06: 116 MS
QTC CALCULATION (BEZET), ECG08: 456 MS
VENTRICULAR RATE, ECG03: 85 BPM

## 2017-10-17 ENCOUNTER — OFFICE VISIT (OUTPATIENT)
Dept: CARDIOLOGY CLINIC | Age: 52
End: 2017-10-17

## 2017-10-17 VITALS
OXYGEN SATURATION: 98 % | HEART RATE: 84 BPM | HEIGHT: 69 IN | DIASTOLIC BLOOD PRESSURE: 80 MMHG | BODY MASS INDEX: 25.92 KG/M2 | WEIGHT: 175 LBS | SYSTOLIC BLOOD PRESSURE: 130 MMHG | RESPIRATION RATE: 18 BRPM

## 2017-10-17 DIAGNOSIS — I48.91 ATRIAL FIBRILLATION WITH RAPID VENTRICULAR RESPONSE (HCC): ICD-10-CM

## 2017-10-17 DIAGNOSIS — I10 UNCONTROLLED HYPERTENSION: ICD-10-CM

## 2017-10-17 DIAGNOSIS — I44.7 LBBB (LEFT BUNDLE BRANCH BLOCK): ICD-10-CM

## 2017-10-17 DIAGNOSIS — I10 ESSENTIAL HYPERTENSION: Primary | ICD-10-CM

## 2017-10-17 NOTE — PROGRESS NOTES
10/17/2017 10:46 AM      Subjective:     Nima Gonzalez is here for f/u after recent visit to [address] [facility]. Underwent ROSLYN/DCCV for a. Fib with RVR. Feels good since then. He denies chest pain, chest pressure/discomfort, dyspnea, palpitations, irregular heart beats, near-syncope, syncope, fatigue, orthopnea, paroxysmal nocturnal dyspnea, exertional chest pressure/discomfort, claudication, lower extremity edema. On EKG LBBB is noted. Visit Vitals    /80    Pulse 84    Resp 18    Ht 5' 9\" (1.753 m)    Wt 175 lb (79.4 kg)    SpO2 98%    BMI 25.84 kg/m2     Current Outpatient Prescriptions   Medication Sig    ASPIRIN/ACETAMINOPHEN (GOODY'S BODY PAIN PO) Take 1 Packet by mouth daily as needed for Pain.  MULTIVITAMIN PO Take 1 Tab by mouth daily.  rivaroxaban (XARELTO) 20 mg tab tablet Take 1 Tab by mouth daily.  dilTIAZem ER (CARDIZEM LA) 240 mg Tb24 tablet Take 1 Tab by mouth daily. No current facility-administered medications for this visit.           Objective:      Visit Vitals    /80    Pulse 84    Resp 18    Ht 5' 9\" (1.753 m)    Wt 175 lb (79.4 kg)    SpO2 98%    BMI 25.84 kg/m2       Data Review:     EKG: Normal sinus rhythm, LBBB    Reviewed and/or ordered active problem list, medication list tests    Past Medical History:   Diagnosis Date    A-fib ([facility]     CAD (coronary artery disease)     a fib    Hypertension     S/P ablation of atrial fibrillation 7/2/2015    Uncontrolled hypertension 4/21/2015      Past Surgical History:   Procedure Laterality Date    CARDIAC SURG PROCEDURE UNLIST      Cauterized parts of his heart    CARDIOVERSION, ELECTIVE  4/21/2015          No Known Allergies   Family History   Problem Relation Age of Onset    Family history unknown: Yes      Social History     Social History    Marital status:      Spouse name: N/A    Number of children: N/A    Years of education: N/A     Occupational History    Not on file.     Social History Main Topics    Smoking status: Never Smoker    Smokeless tobacco: Never Used    Alcohol use 4.2 oz/week     7 Glasses of wine per week      Comment: several drinks a day    Drug use: No    Sexual activity: Not on file     Other Topics Concern    Not on file     Social History Narrative         Review of Systems     General: Not Present- Anorexia, Chills, Dietary Changes, Fatigue, Fever, Medication Changes, Night Sweats, Weight Gain > 10lbs. and Weight Loss > 10lbs. .  Skin: Not Present- Bruising and Excessive Sweating. HEENT: Not Present- Headache, Visual Loss and Vertigo. Respiratory: Not Present- Cough, Decreased Exercise Tolerance, Difficulty Breathing, Snoring and Wheezing. Cardiovascular: Not Present- Abnormal Blood Pressure, Chest Pain, Claudications, Difficulty Breathing On Exertion, Edema, Fainting / Blacking Out, Irregular Heart Beat, Night Cramps, Orthopnea, Palpitations, Paroxysmal Nocturnal Dyspnea, Rapid Heart Rate, Shortness of Breath and Swelling of Extremities. Gastrointestinal: Not Present- Black, Tarry Stool, Bloody Stool, Diarrhea, Hematemesis, Rectal Bleeding and Vomiting. Musculoskeletal: Not Present- Muscle Pain and Muscle Weakness. Neurological: Not Present- Dizziness. Psychiatric: Not Present- Depression. Endocrine: Not Present- Cold Intolerance, Heat Intolerance and Thyroid Problems. Hematology: Not Present- Abnormal Bleeding, Anemia, Blood Clots and Easy Bruising.       Physical Exam   The physical exam findings are as follows:       General   Mental Status - Alert. General Appearance - Not in acute distress. Chest and Lung Exam   Inspection: Accessory muscles - No use of accessory muscles in breathing. Auscultation:   Breath sounds: - Normal.      Cardiovascular   Inspection: Jugular vein - Bilateral - Inspection Normal.  Palpation/Percussion:   Apical Impulse: - Normal.  Auscultation: Rhythm - Regular. Heart Sounds - S1 WNL and S2 WNL.  No S3 or S4.  Murmurs & Other Heart Sounds: Auscultation of the heart reveals - No Murmurs. Carotid arteries - No Carotid bruit. Peripheral Vascular   Upper Extremity: Inspection - Bilateral - No Cyanotic nailbeds or Digital clubbing. Lower Extremity:   Palpation: Edema - Bilateral - No edema. Assessment:       ICD-10-CM ICD-9-CM    1. Essential hypertension I10 401.9 STRESS TEST LEXISCAN/CARDIOLITE   2. Uncontrolled hypertension I10 401.9 AMB POC EKG ROUTINE W/ 12 LEADS, INTER & REP   3. Atrial fibrillation with rapid ventricular response (HCC) I48.91 427.31    4. LBBB (left bundle branch block) I44.7 426.3 STRESS TEST LEXISCAN/CARDIOLITE       Plan:     1. PAF: s/p ROSLYN and DCCV. Remains in sinus rhythm. Continue cardizem and NOAC. S/p ablation in 2015.   2. HTN: controlled. Monitor BP at home. 3. New LBBB: check leximyoview.

## 2017-10-17 NOTE — MR AVS SNAPSHOT
Visit Information Date & Time Provider Department Dept. Phone Encounter #  
 10/17/2017 10:30 AM Jerman Jiang, [address] [facility] [phone] [id number] Follow-up Instructions Return in about 6 months (around 4/17/2018). Follow-up and Disposition History Upcoming Health Maintenance Date Due Hepatitis C Screening 1965 DTaP/Tdap/Td series (1 - Tdap) 6/25/1986 FOBT Q 1 YEAR AGE 50-75 6/25/2015 INFLUENZA AGE 9 TO ADULT 8/1/2017 Allergies as of 10/17/2017  Review Complete On: 10/17/2017 By: Jerman Jiang MD  
 No Known Allergies Current Immunizations  Never Reviewed No immunizations on file. Not reviewed this visit You Were Diagnosed With   
  
 Codes Comments Essential hypertension    -  Primary ICD-10-CM: I10 
ICD-9-CM: 401.9 Uncontrolled hypertension     ICD-10-CM: I10 
ICD-9-CM: 401.9 Atrial fibrillation with rapid ventricular response (HCC)     ICD-10-CM: I48.91 
ICD-9-CM: 427.31   
 LBBB (left bundle branch block)     ICD-10-CM: I44.7 ICD-9-CM: 426. 3 Vitals BP Pulse Resp Height(growth percentile) Weight(growth percentile) SpO2  
 130/80 84 18 5' 9\" (1.753 m) 175 lb (79.4 kg) 98% BMI Smoking Status 25.84 kg/m2 Never Smoker Vitals History BMI and BSA Data Body Mass Index Body Surface Area  
 25.84 kg/m 2 1.97 m 2 Preferred Pharmacy Pharmacy Name Phone [facility]ACY [facility][id number] - [phone] Your Updated Medication List  
  
   
This list is accurate as of: 10/17/17 10:56 AM.  Always use your most recent med list.  
  
  
  
  
 dilTIAZem  mg Tb24 tablet Commonly known as:  CARDIZEM LA Take 1 Tab by mouth daily. GOODY'S BODY PAIN PO Take 1 Packet by mouth daily as needed for Pain. MULTIVITAMIN PO Take 1 Tab by mouth daily. rivaroxaban 20 mg Tab tablet Commonly known as:  Kanchan Silva Take 1 Tab by mouth daily. We Performed the Following AMB POC EKG ROUTINE W/ 12 LEADS, INTER & REP [90305 CPT(R)] Follow-up Instructions Return in about 6 months (around 4/17/2018). To-Do List   
 10/17/2017 ECG:  STRESS TEST LEXISCAN/CARDIOLITE Introducing [facility]! [employer] introduces "[employer] patient portal. Now you can access parts of your medical record, email your doctor's office, and request medication refills online. 1. In your internet browser, go to [email or web address] 2. Click on the First Time User? Click Here link in the Sign In box. You will see the New Member Sign Up page. 3. Enter your "[employer] Access Code exactly as it appears below. You will not need to use this code after youve completed the sign-up process. If you do not sign up before the expiration date, you must request a new code. · "[employer] Access Code: [email or web address] Expires: 12/26/2017  9:09 AM 
 
4. Enter the last four digits of your Social Security Number (xxxx) and Date of Birth (mm/dd/yyyy) as indicated and click Submit. You will be taken to the next sign-up page. 5. Create a "[employer] ID. This will be your "[employer] login ID and cannot be changed, so think of one that is secure and easy to remember. 6. Create a "[employer] password. You can change your password at any time. 7. Enter your Password Reset Question and Answer. This can be used at a later time if you forget your password. 8. Enter your e-mail address. You will receive e-mail notification when new information is available in [address] 9. Click Sign Up. You can now view and download portions of your medical record. 10. Click the Download Summary menu link to download a portable copy of your medical information.  
 
If you have questions, please visit the Frequently Asked Questions section of the [employer]. Remember, [employer] is NOT to be used for urgent needs. For medical emergencies, dial 911. Now available from your iPhone and Android! Please provide this summary of care documentation to your next provider. Your primary care clinician is listed as NONE. If you have any questions after today's visit, please call [phone].

## 2017-11-09 ENCOUNTER — CLINICAL SUPPORT (OUTPATIENT)
Dept: CARDIOLOGY CLINIC | Age: 52
End: 2017-11-09

## 2017-11-09 DIAGNOSIS — R93.1 ABNORMAL NUCLEAR CARDIAC IMAGING TEST: Primary | ICD-10-CM

## 2017-11-09 DIAGNOSIS — I44.7 LBBB (LEFT BUNDLE BRANCH BLOCK): ICD-10-CM

## 2017-11-09 DIAGNOSIS — I10 ESSENTIAL HYPERTENSION: ICD-10-CM

## 2017-11-13 ENCOUNTER — CLINICAL SUPPORT (OUTPATIENT)
Dept: CARDIOLOGY CLINIC | Age: 52
End: 2017-11-13

## 2017-11-13 DIAGNOSIS — I44.7 LBBB (LEFT BUNDLE BRANCH BLOCK): ICD-10-CM

## 2017-11-13 DIAGNOSIS — I10 UNCONTROLLED HYPERTENSION: Primary | ICD-10-CM

## 2017-11-13 DIAGNOSIS — I10 ESSENTIAL HYPERTENSION: ICD-10-CM

## 2017-11-16 ENCOUNTER — OFFICE VISIT (OUTPATIENT)
Dept: CARDIOLOGY CLINIC | Age: 52
End: 2017-11-16

## 2017-11-16 VITALS
HEIGHT: 69 IN | HEART RATE: 84 BPM | SYSTOLIC BLOOD PRESSURE: 120 MMHG | RESPIRATION RATE: 12 BRPM | WEIGHT: 181.1 LBS | DIASTOLIC BLOOD PRESSURE: 70 MMHG | BODY MASS INDEX: 26.82 KG/M2 | OXYGEN SATURATION: 96 %

## 2017-11-16 DIAGNOSIS — I44.7 LBBB (LEFT BUNDLE BRANCH BLOCK): Primary | ICD-10-CM

## 2017-11-16 DIAGNOSIS — I10 ESSENTIAL HYPERTENSION: ICD-10-CM

## 2017-11-16 DIAGNOSIS — R94.39 ABNORMAL STRESS TEST: ICD-10-CM

## 2017-11-16 DIAGNOSIS — Z86.79: ICD-10-CM

## 2017-11-16 RX ORDER — METOPROLOL TARTRATE 25 MG/1
12.5 TABLET, FILM COATED ORAL 2 TIMES DAILY
Qty: 30 TAB | Refills: 0 | Status: SHIPPED | OUTPATIENT
Start: 2017-11-16 | End: 2018-01-09

## 2017-11-16 NOTE — MR AVS SNAPSHOT
Visit Information Date & Time Provider Department Dept. Phone Encounter #  
 11/16/2017  2:45 PM Nneka Jackson, [address] [place] [address] [facility] [phone] [id number] Upcoming Health Maintenance Date Due Hepatitis C Screening 1965 DTaP/Tdap/Td series (1 - Tdap) 6/25/1986 FOBT Q 1 YEAR AGE 50-75 6/25/2015 Influenza Age 5 to Adult 8/1/2017 Allergies as of 11/16/2017  Review Complete On: 11/16/2017 By: Nneka Jackson MD  
 No Known Allergies Current Immunizations  Never Reviewed No immunizations on file. Not reviewed this visit You Were Diagnosed With   
  
 Codes Comments LBBB (left bundle branch block)    -  Primary ICD-10-CM: I44.7 ICD-9-CM: 426.3 Essential hypertension     ICD-10-CM: I10 
ICD-9-CM: 401.9 Abnormal stress test     ICD-10-CM: R94.39 
ICD-9-CM: 794.39 History of angina, class III     ICD-10-CM: Z86.79 
ICD-9-CM: V12.59 Vitals BP Pulse Resp Height(growth percentile) Weight(growth percentile) SpO2  
 120/70 (BP Patient Position: Sitting) 84 12 5' 9\" (1.753 m) 181 lb 1.6 oz (82.1 kg) 96% BMI Smoking Status 26.74 kg/m2 Never Smoker BMI and BSA Data Body Mass Index Body Surface Area  
 26.74 kg/m 2 2 m 2 Preferred Pharmacy Pharmacy Name Phone [facility]AC[facility][id number] - [id number], [address] [phone] Your Updated Medication List  
  
   
This list is accurate as of: 11/16/17  3:25 PM.  Always use your most recent med list.  
  
  
  
  
 dilTIAZem  mg Tb24 tablet Commonly known as:  CARDIZEM LA Take 1 Tab by mouth daily. GOODY'S BODY PAIN PO Take 1 Packet by mouth daily as needed for Pain.  
  
 metoprolol tartrate 25 mg tablet Commonly known as:  LOPRESSOR Take 0.5 Tabs by mouth two (2) times a day. MULTIVITAMIN PO Take 1 Tab by mouth daily. rivaroxaban 20 mg Tab tablet Commonly known as:  Andrew Elder Take 1 Tab by mouth daily. Prescriptions Sent to Pharmacy Refills  
 metoprolol tartrate (LOPRESSOR) 25 mg tablet 0 Sig: Take 0.5 Tabs by mouth two (2) times a day. Class: Normal  
 Pharmacy: [facility] #: [phone] Route: Oral  
  
We Performed the Following CBC W/O DIFF [56094 CPT(R)] LIPID PANEL [23775 CPT(R)] METABOLIC PANEL, COMPREHENSIVE [33295 CPT(R)] PROTHROMBIN TIME + INR [31469 CPT(R)] To-Do List   
 11/16/2017 Cardiac Services:  CARDIAC CATHETERIZATION Introducing \[facility] [place] [facility]\"" & [facility]! Dear Keven Peck: 
Thank you for requesting a [employer] account. Our records indicate that you already have an active [employer] account. You can access your account anytime at [email or web address] Did you know that you can access your hospital and ER discharge instructions at any time in [employer]? You can also review all of your test results from your hospital stay or ER visit. Additional Information If you have questions, please visit the Frequently Asked Questions section of the [employer] website at [email or web address] Remember, [employer] is NOT to be used for urgent needs. For medical emergencies, dial 911. Now available from your iPhone and Android! Please provide this summary of care documentation to your next provider. Your primary care clinician is listed as NONE. If you have any questions after today's visit, please call [phone].

## 2017-11-16 NOTE — PROGRESS NOTES
11/16/2017 3:23 PM      Subjective:     Alison Escobar is here for f/u visit and to discuss stress results. Has notice left sided chest discomfort at time. He denies dyspnea, orthopnea, paroxysmal nocturnal dyspnea, claudication, lower extremity edema. After last visit had stress test and Echo. Myocardial perfusion imaging is abnormal: there is a large area of infarction in the apical and septal regions. Overall left ventricular systolic function was abnormal: with regional wall motion abnormalities (as noted above).      These test results indicate high likelihood for the presence of angiographically significant coronary artery disease.      Echo:   Left ventricle: Systolic function was normal. Ejection fraction was  estimated in the range of 55 % to 60 %. There were no regional wall motion  abnormalities. Low EF on stress test is most likely gating artifact. Visit Vitals    /70 (BP Patient Position: Sitting)  Comment: lt/rt/lg    Pulse 84    Resp 12    Ht 5' 9\" (1.753 m)    Wt 181 lb 1.6 oz (82.1 kg)    SpO2 96%    BMI 26.74 kg/m2     Current Outpatient Prescriptions   Medication Sig    metoprolol tartrate (LOPRESSOR) 25 mg tablet Take 0.5 Tabs by mouth two (2) times a day.  ASPIRIN/ACETAMINOPHEN (GOODY'S BODY PAIN PO) Take 1 Packet by mouth daily as needed for Pain.  MULTIVITAMIN PO Take 1 Tab by mouth daily.  rivaroxaban (XARELTO) 20 mg tab tablet Take 1 Tab by mouth daily.  dilTIAZem ER (CARDIZEM LA) 240 mg Tb24 tablet Take 1 Tab by mouth daily. No current facility-administered medications for this visit.           Objective:      Visit Vitals    /70 (BP Patient Position: Sitting)    Pulse 84    Resp 12    Ht 5' 9\" (1.753 m)    Wt 181 lb 1.6 oz (82.1 kg)    SpO2 96%    BMI 26.74 kg/m2       Data Review:     Reviewed and/or ordered active problem list, medication list tests    Past Medical History:   Diagnosis Date    [facility]  CAD (coronary artery disease)     a fib    Hypertension     S/P ablation of atrial fibrillation 7/2/2015    Uncontrolled hypertension 4/21/2015      Past Surgical History:   Procedure Laterality Date    CARDIAC SURG PROCEDURE UNLIST      Cauterized parts of his heart    CARDIOVERSION, ELECTIVE  4/21/2015          No Known Allergies   Family History   Problem Relation Age of Onset    Family history unknown: Yes      Social History     Social History    Marital status:      Spouse name: N/A    Number of children: N/A    Years of education: N/A     Occupational History    Not on file. Social History Main Topics    Smoking status: Never Smoker    Smokeless tobacco: Never Used    Alcohol use 4.2 oz/week     7 Glasses of wine per week      Comment: several drinks a day    Drug use: No    Sexual activity: Not on file     Other Topics Concern    Not on file     Social History Narrative         Review of Systems     General: Not Present- Anorexia, Chills, Dietary Changes, Fatigue, Fever, Medication Changes, Night Sweats, Weight Gain > 10lbs. and Weight Loss > 10lbs. .  Skin: Not Present- Bruising and Excessive Sweating. HEENT: Not Present- Headache, Visual Loss and Vertigo. Respiratory: Not Present- Cough, Decreased Exercise Tolerance, Difficulty Breathing, Snoring and Wheezing. Cardiovascular: Not Present- Abnormal Blood Pressure, Claudications, Difficulty Breathing On Exertion, Edema, Fainting / Blacking Out, Irregular Heart Beat, Night Cramps, Orthopnea, Palpitations, Paroxysmal Nocturnal Dyspnea, Rapid Heart Rate, Shortness of Breath and Swelling of Extremities. Gastrointestinal: Not Present- Black, Tarry Stool, Bloody Stool, Diarrhea, Hematemesis, Rectal Bleeding and Vomiting. Musculoskeletal: Not Present- Muscle Pain and Muscle Weakness. Neurological: Not Present- Dizziness. Psychiatric: Not Present- Depression.   Endocrine: Not Present- Cold Intolerance, Heat Intolerance and Thyroid Problems. Hematology: Not Present- Abnormal Bleeding, Anemia, Blood Clots and Easy Bruising.       Physical Exam   The physical exam findings are as follows:       General   Mental Status - Alert. General Appearance - Not in acute distress. Chest and Lung Exam   Inspection: Accessory muscles - No use of accessory muscles in breathing. Auscultation:   Breath sounds: - Normal.      Cardiovascular   Inspection: Jugular vein - Bilateral - Inspection Normal.  Palpation/Percussion:   Apical Impulse: - Normal.  Auscultation: Rhythm - Regular. Heart Sounds - S1 WNL and S2 WNL. No S3 or S4. Murmurs & Other Heart Sounds: Auscultation of the heart reveals - No Murmurs. Carotid arteries - No Carotid bruit. Peripheral Vascular   Upper Extremity: Inspection - Bilateral - No Cyanotic nailbeds or Digital clubbing. Lower Extremity:   Palpation: Edema - Bilateral - No edema. Assessment:       ICD-10-CM ICD-9-CM    1. LBBB (left bundle branch block) I44.7 426.3 LIPID PANEL      CBC W/O DIFF      METABOLIC PANEL, COMPREHENSIVE      PROTHROMBIN TIME + INR      CARDIAC CATHETERIZATION   2. Essential hypertension I10 401.9 LIPID PANEL      CBC W/O DIFF      METABOLIC PANEL, COMPREHENSIVE      PROTHROMBIN TIME + INR      CARDIAC CATHETERIZATION   3. Abnormal stress test R94.39 794.39 LIPID PANEL      CBC W/O DIFF      METABOLIC PANEL, COMPREHENSIVE      PROTHROMBIN TIME + INR      CARDIAC CATHETERIZATION   4. History of angina, class III Z86.79 V12.59 LIPID PANEL      CBC W/O DIFF      METABOLIC PANEL, COMPREHENSIVE      PROTHROMBIN TIME + INR      CARDIAC CATHETERIZATION       Plan:     1. Abnormal stress test: perfusion abnormality most likely due to LBBB, however c/o left sided chest pain off and on. Add BB. Check FLP. Taking aspirin. Option of medical treatment vs cardiac cath d/w pt. Will proceed with cardiac cath, since at times having rest chest discomfort. 1. PAF: s/p ROSLYN and DCCV.  Remains in sinus rhythm. Continue cardizem and NOAC. S/p ablation in 2015.   2. HTN: controlled. 4. Check FLP.

## 2017-11-16 NOTE — PROGRESS NOTES
1. Have you been to the ER, urgent care clinic since your last visit? Hospitalized since your last visit? No    2. Have you seen or consulted any other health care providers outside of the [address] [place] [address] since your last visit? Include any pap smears or colon screening. No     Patient has no cardiac complaints here for review of recent testing.

## 2017-11-27 ENCOUNTER — TELEPHONE (OUTPATIENT)
Dept: CARDIOLOGY CLINIC | Age: 52
End: 2017-11-27

## 2017-11-27 NOTE — TELEPHONE ENCOUNTER
Called pt and left message in regards to his lab work and to call me back. Looked in chart and saw that pt had his labs done. Printed off and will have  review for his upcoming cath.

## 2017-11-28 LAB
ALBUMIN SERPL-MCNC: 4.6 G/DL (ref 3.5–5.5)
ALBUMIN/GLOB SERPL: 1.8 {RATIO} (ref 1.2–2.2)
ALP SERPL-CCNC: 54 IU/L (ref 39–117)
ALT SERPL-CCNC: 28 IU/L (ref 0–44)
AST SERPL-CCNC: 22 IU/L (ref 0–40)
BILIRUB SERPL-MCNC: 0.5 MG/DL (ref 0–1.2)
BUN SERPL-MCNC: 17 MG/DL (ref 6–24)
BUN/CREAT SERPL: 19 (ref 9–20)
CALCIUM SERPL-MCNC: 9.6 MG/DL (ref 8.7–10.2)
CHLORIDE SERPL-SCNC: 99 MMOL/L (ref 96–106)
CHOLEST SERPL-MCNC: 260 MG/DL (ref 100–199)
CO2 SERPL-SCNC: 26 MMOL/L (ref 18–29)
CREAT SERPL-MCNC: 0.9 MG/DL (ref 0.76–1.27)
ERYTHROCYTE [DISTWIDTH] IN BLOOD BY AUTOMATED COUNT: 13.3 % (ref 12.3–15.4)
GFR SERPLBLD CREATININE-BSD FMLA CKD-EPI: 113 ML/MIN/1.73
GFR SERPLBLD CREATININE-BSD FMLA CKD-EPI: 98 ML/MIN/1.73
GLOBULIN SER CALC-MCNC: 2.5 G/DL (ref 1.5–4.5)
GLUCOSE SERPL-MCNC: 106 MG/DL (ref 65–99)
HCT VFR BLD AUTO: 45.4 % (ref 37.5–51)
HDLC SERPL-MCNC: 63 MG/DL
HGB BLD-MCNC: 15.6 G/DL (ref 12.6–17.7)
INR PPP: 1.1 (ref 0.8–1.2)
INTERPRETATION, 910389: NORMAL
LDLC SERPL CALC-MCNC: 181 MG/DL (ref 0–99)
MCH RBC QN AUTO: 31.5 PG (ref 26.6–33)
MCHC RBC AUTO-ENTMCNC: 34.4 G/DL (ref 31.5–35.7)
MCV RBC AUTO: 92 FL (ref 79–97)
PLATELET # BLD AUTO: 331 X10E3/UL (ref 150–379)
POTASSIUM SERPL-SCNC: 4.5 MMOL/L (ref 3.5–5.2)
PROT SERPL-MCNC: 7.1 G/DL (ref 6–8.5)
PROTHROMBIN TIME: 11.9 SEC (ref 9.1–12)
RBC # BLD AUTO: 4.96 X10E6/UL (ref 4.14–5.8)
SODIUM SERPL-SCNC: 140 MMOL/L (ref 134–144)
TRIGL SERPL-MCNC: 81 MG/DL (ref 0–149)
VLDLC SERPL CALC-MCNC: 16 MG/DL (ref 5–40)
WBC # BLD AUTO: 8 X10E3/UL (ref 3.4–10.8)

## 2017-11-29 ENCOUNTER — HOSPITAL ENCOUNTER (OUTPATIENT)
Dept: CARDIAC CATH/INVASIVE PROCEDURES | Age: 52
Discharge: HOME | End: 2017-11-29
Attending: INTERNAL MEDICINE | Admitting: INTERNAL MEDICINE
Payer: COMMERCIAL

## 2017-11-29 VITALS
SYSTOLIC BLOOD PRESSURE: 126 MMHG | WEIGHT: 170 LBS | HEART RATE: 74 BPM | HEIGHT: 69 IN | DIASTOLIC BLOOD PRESSURE: 106 MMHG | RESPIRATION RATE: 18 BRPM | TEMPERATURE: 97.7 F | OXYGEN SATURATION: 98 % | BODY MASS INDEX: 25.18 KG/M2

## 2017-11-29 DIAGNOSIS — Z86.79: ICD-10-CM

## 2017-11-29 DIAGNOSIS — R94.39 ABNORMAL STRESS TEST: ICD-10-CM

## 2017-11-29 DIAGNOSIS — I10 ESSENTIAL HYPERTENSION: ICD-10-CM

## 2017-11-29 DIAGNOSIS — I44.7 LBBB (LEFT BUNDLE BRANCH BLOCK): ICD-10-CM

## 2017-11-29 PROCEDURE — C1894 INTRO/SHEATH, NON-LASER: HCPCS

## 2017-11-29 PROCEDURE — 74011250636 HC RX REV CODE- 250/636

## 2017-11-29 PROCEDURE — C1769 GUIDE WIRE: HCPCS

## 2017-11-29 PROCEDURE — 74011636320 HC RX REV CODE- 636/320

## 2017-11-29 PROCEDURE — 77030010221 HC SPLNT WR POS TELE -B

## 2017-11-29 PROCEDURE — 74011000250 HC RX REV CODE- 250

## 2017-11-29 PROCEDURE — 77030008543 HC TBNG MON PRSS MRTM -A

## 2017-11-29 PROCEDURE — 93458 L HRT ARTERY/VENTRICLE ANGIO: CPT

## 2017-11-29 PROCEDURE — 77030019698 HC SYR ANGI MDLON MRTM -A

## 2017-11-29 PROCEDURE — 77030019569 HC BND COMPR RAD TERU -B

## 2017-11-29 PROCEDURE — 77030015766

## 2017-11-29 PROCEDURE — 74011250636 HC RX REV CODE- 250/636: Performed by: INTERNAL MEDICINE

## 2017-11-29 PROCEDURE — 77030004549 HC CATH ANGI DX PRF MRTM -A

## 2017-11-29 RX ORDER — VERAPAMIL HYDROCHLORIDE 2.5 MG/ML
2.5 INJECTION, SOLUTION INTRAVENOUS ONCE
Status: COMPLETED | OUTPATIENT
Start: 2017-11-29 | End: 2017-11-29

## 2017-11-29 RX ORDER — SODIUM CHLORIDE 9 MG/ML
75 INJECTION, SOLUTION INTRAVENOUS CONTINUOUS
Status: CANCELLED | OUTPATIENT
Start: 2017-11-29 | End: 2017-11-30

## 2017-11-29 RX ORDER — HEPARIN SODIUM 200 [USP'U]/100ML
500 INJECTION, SOLUTION INTRAVENOUS ONCE
Status: COMPLETED | OUTPATIENT
Start: 2017-11-29 | End: 2017-11-29

## 2017-11-29 RX ORDER — HEPARIN SODIUM 200 [USP'U]/100ML
INJECTION, SOLUTION INTRAVENOUS
Status: COMPLETED
Start: 2017-11-29 | End: 2017-11-29

## 2017-11-29 RX ORDER — HEPARIN SODIUM 1000 [USP'U]/ML
1000-10000 INJECTION, SOLUTION INTRAVENOUS; SUBCUTANEOUS
Status: DISCONTINUED | OUTPATIENT
Start: 2017-11-29 | End: 2017-11-29 | Stop reason: HOSPADM

## 2017-11-29 RX ORDER — LIDOCAINE HYDROCHLORIDE 10 MG/ML
1-30 INJECTION, SOLUTION EPIDURAL; INFILTRATION; INTRACAUDAL; PERINEURAL
Status: DISCONTINUED | OUTPATIENT
Start: 2017-11-29 | End: 2017-11-29 | Stop reason: HOSPADM

## 2017-11-29 RX ORDER — LIDOCAINE HYDROCHLORIDE 10 MG/ML
INJECTION, SOLUTION EPIDURAL; INFILTRATION; INTRACAUDAL; PERINEURAL
Status: COMPLETED
Start: 2017-11-29 | End: 2017-11-29

## 2017-11-29 RX ORDER — FENTANYL CITRATE 50 UG/ML
25-50 INJECTION, SOLUTION INTRAMUSCULAR; INTRAVENOUS
Status: DISCONTINUED | OUTPATIENT
Start: 2017-11-29 | End: 2017-11-29 | Stop reason: HOSPADM

## 2017-11-29 RX ORDER — MIDAZOLAM HYDROCHLORIDE 1 MG/ML
INJECTION, SOLUTION INTRAMUSCULAR; INTRAVENOUS
Status: COMPLETED
Start: 2017-11-29 | End: 2017-11-29

## 2017-11-29 RX ORDER — MIDAZOLAM HYDROCHLORIDE 1 MG/ML
.5-2 INJECTION, SOLUTION INTRAMUSCULAR; INTRAVENOUS
Status: DISCONTINUED | OUTPATIENT
Start: 2017-11-29 | End: 2017-11-29 | Stop reason: HOSPADM

## 2017-11-29 RX ORDER — VERAPAMIL HYDROCHLORIDE 2.5 MG/ML
INJECTION, SOLUTION INTRAVENOUS
Status: COMPLETED
Start: 2017-11-29 | End: 2017-11-29

## 2017-11-29 RX ORDER — HEPARIN SODIUM 1000 [USP'U]/ML
INJECTION, SOLUTION INTRAVENOUS; SUBCUTANEOUS
Status: COMPLETED
Start: 2017-11-29 | End: 2017-11-29

## 2017-11-29 RX ORDER — FENTANYL CITRATE 50 UG/ML
INJECTION, SOLUTION INTRAMUSCULAR; INTRAVENOUS
Status: COMPLETED
Start: 2017-11-29 | End: 2017-11-29

## 2017-11-29 RX ADMIN — MIDAZOLAM 2 MG: 1 INJECTION INTRAMUSCULAR; INTRAVENOUS at 16:56

## 2017-11-29 RX ADMIN — FENTANYL CITRATE 50 MCG: 50 INJECTION, SOLUTION INTRAMUSCULAR; INTRAVENOUS at 16:56

## 2017-11-29 RX ADMIN — HEPARIN SODIUM 2500 UNITS: 1000 INJECTION, SOLUTION INTRAVENOUS; SUBCUTANEOUS at 17:08

## 2017-11-29 RX ADMIN — FENTANYL CITRATE 25 MCG: 50 INJECTION, SOLUTION INTRAMUSCULAR; INTRAVENOUS at 17:12

## 2017-11-29 RX ADMIN — LIDOCAINE HYDROCHLORIDE 2 ML: 10 INJECTION, SOLUTION EPIDURAL; INFILTRATION; INTRACAUDAL; PERINEURAL at 17:07

## 2017-11-29 RX ADMIN — MIDAZOLAM HYDROCHLORIDE 1 MG: 1 INJECTION, SOLUTION INTRAMUSCULAR; INTRAVENOUS at 17:05

## 2017-11-29 RX ADMIN — MIDAZOLAM HYDROCHLORIDE 2 MG: 1 INJECTION, SOLUTION INTRAMUSCULAR; INTRAVENOUS at 16:56

## 2017-11-29 RX ADMIN — VERAPAMIL HYDROCHLORIDE 2.5 MG: 2.5 INJECTION, SOLUTION INTRAVENOUS at 17:08

## 2017-11-29 RX ADMIN — NITROGLYCERIN 200 MCG: 5 INJECTION, SOLUTION INTRAVENOUS at 17:08

## 2017-11-29 RX ADMIN — MIDAZOLAM HYDROCHLORIDE 1 MG: 1 INJECTION, SOLUTION INTRAMUSCULAR; INTRAVENOUS at 17:11

## 2017-11-29 RX ADMIN — Medication 1000 UNITS: at 17:07

## 2017-11-29 RX ADMIN — MIDAZOLAM HYDROCHLORIDE 1 MG: 1 INJECTION, SOLUTION INTRAMUSCULAR; INTRAVENOUS at 17:19

## 2017-11-29 RX ADMIN — IOPAMIDOL 50 ML: 755 INJECTION, SOLUTION INTRAVENOUS at 17:19

## 2017-11-29 RX ADMIN — FENTANYL CITRATE 25 MCG: 50 INJECTION, SOLUTION INTRAMUSCULAR; INTRAVENOUS at 17:06

## 2017-11-29 RX ADMIN — VERAPAMIL HYDROCHLORIDE 2.5 MG: 2.5 INJECTION INTRAVENOUS at 17:08

## 2017-11-29 RX ADMIN — Medication 1000 UNITS: at 17:06

## 2017-11-29 RX ADMIN — HEPARIN SODIUM 1000 UNITS: 200 INJECTION, SOLUTION INTRAVENOUS at 17:06

## 2017-11-29 NOTE — DISCHARGE INSTRUCTIONS
Patient ID:  Gildardo Corcoran  [id number]  7 y.o.  1965    Admit Date: 11/29/2017    Discharge Date: 11/29/2017     Admitting Physician: Jerman Jiang MD     Discharge Physician: Jerman Jiang MD    Admission Diagnoses: LBBB (left bundle branch block) [I44.7]  Essential hypertension [I10]  Abnormal stress test [R94.39]  History of angina, class III [Z86.79]    Discharge Diagnoses: Active Problems:    * No active hospital problems. *      Discharge Condition: Good    Cardiology Procedures this Admission:  Diagnostic left heart catheterization    Disposition: home    Patient Instructions:       Reference discharge instructions provided by nursing for diet and activity. Follow-up with me in 6 wks. Signed: Jerman Jiang MD  11/29/2017  5:21 PM      Radial Cardiac Catheterization/Angiography Discharge Instructions    It is normal to feel tired the first couple days. Take it easy and follow the physicians instructions. CHECK THE CATHETER INSERTION SITE DAILY:    Remove the wrist dressing 24 hours after the procedure. You may shower 24 hours after the procedure. Wash with soap and water and pat dry. Gentle cleaning of the site with soap and water is sufficient, cover with a dry clean dressing or bandage. Do not apply creams or powders to the area. No soaking the wrist for 3 days. Leave the puncture site open to air after 24 hours post-procedure. CALL THE PHYSICIANS:     If the site becomes red, swollen or feels warm to the touch  If there is bleeding or drainage or if there is unusual pain at the radial site. If there is any minor oozing, you may apply a band-aid and remove after 12 hours.    If the bleeding continues, hold pressure with the middle finger against the puncture site and the thumb against the back of the wrist,call 911 to be transported to the hospital.  DO  [address] 911.    ACTIVITY:   For the first 24 hours do not manipulate the wrist.  No lifting, pushing or pulling over 3-5 pounds with the affected wrist for 7 daysand no straining the insertion site. Do not life grocery bags or the garbage can, do not run the vacuum  or  for 7 days. Start with short walks as in the hospital and gradually increase as tolerated each day. It is recommended to walk 30 minutes 5-7 days per week. Follow your physicians instructions on activity. Avoid walking outside in extremes of heat or cold. Walk inside when it is cold and windy or hot and humid. Things to keep in mind:  No driving for at least 24 hours, or as designated by your physician. Limit the number of times you go up and down the stairs  Take rests and pace yourself with activity. Be careful and do not strain with bowel movements. MEDICATIONS:    Take all medications as prescribed  Call your physician if you have any questions  Keep an updated list of your medications with you at all times and give a list to your physician and pharmacist    SIGNS AND SYMPTOMS:   Be cautious of symptoms of angina or recurrent symptoms such as chest discomfort, unusual shortness of breath or fatigue. These could be symptoms of restenosis, a new blockage or a heart attack. If your symptoms are relieved with rest it is still recommended that you notify your physician of recurrent chest pain or discomfort. For CHEST PAIN or symptoms of angina not relieved with rest:  If the discomfort is not relieved with rest, and you have been prescribed Nitroglycerin, take as directed (taken under the tongue, one at a time 5 minutes apart for a total of 3 doses). If the discomfort is not relieved after the 3rd nitroglycerin, call 911. If you have not been prescribed Nitroglycerin  and your chest discomfort is not relieved with rest, call 911. AFTER CARE:   Follow up with your physician as instructed.   Follow a heart healthy diet with proper portion control, daily stress management, daily exercise, blood pressure and cholesterol control , and smoking cessation.

## 2017-11-29 NOTE — IP AVS SNAPSHOT
[address] [facility] 
[phone] Patient: Alicia Hope MRN: [id number] :1965 About your hospitalization You were admitted on:  2017 You last received care in the:  [facility] 2 INTRVNTNL CARDIO You were discharged on:  2017 Why you were hospitalized Your primary diagnosis was:  Not on File Things You Need To Do (next 8 weeks) Follow up with None Where:  None (395) Patient stated that they have no PCP Discharge Orders None A check ricardo indicates which time of day the medication should be taken. My Medications TAKE these medications as instructed Instructions Each Dose to Equal  
 Morning Noon Evening Bedtime  
 dilTIAZem  mg Tb24 tablet Commonly known as:  CARDIZEM LA Your last dose was: Your next dose is: Take 1 Tab by mouth daily. 240 mg  
    
   
   
   
  
 GOODY'S BODY PAIN PO Your last dose was: Your next dose is: Take 1 Packet by mouth daily as needed for Pain. 1 Packet  
    
   
   
   
  
 metoprolol tartrate 25 mg tablet Commonly known as:  LOPRESSOR Your last dose was: Your next dose is: Take 0.5 Tabs by mouth two (2) times a day. 12.5 mg  
    
   
   
   
  
 MULTIVITAMIN PO Your last dose was: Your next dose is: Take 1 Tab by mouth daily. 1 Tab  
    
   
   
   
  
 rivaroxaban 20 mg Tab tablet Commonly known as:  Jenhanny Mention Your last dose was: Your next dose is: Take 1 Tab by mouth daily. 20 mg Discharge Instructions Patient ID: 
Alicia Hope 
[id number] 
42 y.o. 
1965 Admit Date: 2017 Discharge Date: 2017 Admitting Physician: Kalee Romero MD  
 
 Discharge Physician: Charmaine Archer MD 
 
Admission Diagnoses: LBBB (left bundle branch block) [I44.7] Essential hypertension [I10] Abnormal stress test [R94.39] History of angina, class III [Z86.79] Discharge Diagnoses: Active Problems: * No active hospital problems. * Discharge Condition: Good Cardiology Procedures this Admission:  Diagnostic left heart catheterization Disposition: home Patient Instructions:  
 
 
Reference discharge instructions provided by nursing for diet and activity. Follow-up with me in 6 wks. Signed: Charmaine Archer MD 
11/29/2017 5:21 PM 
 
 
Radial Cardiac Catheterization/Angiography Discharge Instructions It is normal to feel tired the first couple days. Take it easy and follow the physicians instructions. CHECK THE CATHETER INSERTION SITE DAILY: 
 
Remove the wrist dressing 24 hours after the procedure. You may shower 24 hours after the procedure. Wash with soap and water and pat dry. Gentle cleaning of the site with soap and water is sufficient, cover with a dry clean dressing or bandage. Do not apply creams or powders to the area. No soaking the wrist for 3 days. Leave the puncture site open to air after 24 hours post-procedure. CALL THE PHYSICIANS:  
 
If the site becomes red, swollen or feels warm to the touch If there is bleeding or drainage or if there is unusual pain at the radial site. If there is any minor oozing, you may apply a band-aid and remove after 12 hours. If the bleeding continues, hold pressure with the middle finger against the puncture site and the thumb against the back of the wrist,call 911 to be transported to the hospital. 
DO NOT DRIVE YOURSELF, [address] [address].  
 
ACTIVITY:  
For the first 24 hours do not manipulate the wrist. 
No lifting, pushing or pulling over 3-5 pounds with the affected wrist for 7 daysand no straining the insertion site. Do not life grocery bags or the garbage can, do not run the vacuum  or  for 7 days. Start with short walks as in the hospital and gradually increase as tolerated each day. It is recommended to walk 30 minutes 5-7 days per week. Follow your physicians instructions on activity. Avoid walking outside in extremes of heat or cold. Walk inside when it is cold and windy or hot and humid. Things to keep in mind: 
No driving for at least 24 hours, or as designated by your physician. Limit the number of times you go up and down the stairs Take rests and pace yourself with activity. Be careful and do not strain with bowel movements. MEDICATIONS: 
 
Take all medications as prescribed Call your physician if you have any questions Keep an updated list of your medications with you at all times and give a list to your physician and pharmacist 
 
SIGNS AND SYMPTOMS:  
Be cautious of symptoms of angina or recurrent symptoms such as chest discomfort, unusual shortness of breath or fatigue. These could be symptoms of restenosis, a new blockage or a heart attack. If your symptoms are relieved with rest it is still recommended that you notify your physician of recurrent chest pain or discomfort. For CHEST PAIN or symptoms of angina not relieved with rest:  If the discomfort is not relieved with rest, and you have been prescribed Nitroglycerin, take as directed (taken under the tongue, one at a time 5 minutes apart for a total of 3 doses). If the discomfort is not relieved after the 3rd nitroglycerin, call 911. If you have not been prescribed Nitroglycerin  and your chest discomfort is not relieved with rest, call 911. AFTER CARE:  
Follow up with your physician as instructed. Follow a heart healthy diet with proper portion control, daily stress management, daily exercise, blood pressure and cholesterol control , and smoking cessation. Introducing [facility]! Dear Josephine Hylton: 
Thank you for requesting a [employer] account. Our records indicate that you already have an active [employer] account. You can access your account anytime at [email or web address] Did you know that you can access your hospital and ER discharge instructions at any time in [employer]? You can also review all of your test results from your hospital stay or ER visit. Additional Information If you have questions, please visit the Frequently Asked Questions section of the [employer] website at [email or web address] Remember, [employer] is NOT to be used for urgent needs. For medical emergencies, dial 911. Now available from your iPhone and Android! Providers Seen During Your Hospitalization Provider Specialty Primary office phone Shayy Rivera MD Cardiology [phone] Your Primary Care Physician (PCP) Primary Care Physician Office Phone Office Fax NONE ** None ** ** None ** You are allergic to the following No active allergies Recent Documentation Height Weight BMI Smoking Status 1.753 m 77.1 kg 25.1 kg/m2 Never Smoker Emergency Contacts Name Discharge Info Relation Home Work Mobile Cassie Gracia  Other Relative [6] [phone] Jessi Perez  Parent [1] [phone] Patient Belongings The following personal items are in your possession at time of discharge: 
  Dental Appliances: None         Home Medications: None   Jewelry: None  Clothing: At bedside    Other Valuables: None Please provide this summary of care documentation to your next provider. Signatures-by signing, you are acknowledging that this After Visit Summary has been reviewed with you and you have received a copy. Patient Signature:  ____________________________________________________________ Date:  ____________________________________________________________  
  
Leesa Sleight Provider Signature:  ____________________________________________________________ Date:  ____________________________________________________________

## 2017-11-29 NOTE — IP AVS SNAPSHOT
[address] [facility] 
[phone] Patient: Munira Martínez MRN: [id number] :1965 My Medications TAKE these medications as instructed Instructions Each Dose to Equal  
 Morning Noon Evening Bedtime  
 dilTIAZem  mg Tb24 tablet Commonly known as:  CARDIZEM LA Your last dose was: Your next dose is: Take 1 Tab by mouth daily. 240 mg  
    
   
   
   
  
 GOODY'S BODY PAIN PO Your last dose was: Your next dose is: Take 1 Packet by mouth daily as needed for Pain. 1 Packet  
    
   
   
   
  
 metoprolol tartrate 25 mg tablet Commonly known as:  LOPRESSOR Your last dose was: Your next dose is: Take 0.5 Tabs by mouth two (2) times a day. 12.5 mg  
    
   
   
   
  
 MULTIVITAMIN PO Your last dose was: Your next dose is: Take 1 Tab by mouth daily. 1 Tab  
    
   
   
   
  
 rivaroxaban 20 mg Tab tablet Commonly known as:  Kanchan Silva Your last dose was: Your next dose is: Take 1 Tab by mouth daily.   
 20 mg

## 2017-11-29 NOTE — IP AVS SNAPSHOT
Summary of Care Report The Summary of Care report has been created to help improve care coordination. Users with access to [employer] or [address] (Web-based application) may access additional patient information including the Discharge Summary. If you are not currently a [address] user and need more information, please call the number listed below in the [place] section and ask to be connected with Medical Records. Facility Information Name Address Phone [place] [phone] Patient Information Patient Name Sex  Kenton Saint ([id number]) Male 1965 Discharge Information Admitting Provider Service Area Unit Jerman Jiang MD / [phone] Tim  / [phone] Discharge Provider Discharge Date/Time Discharge Disposition Destination (none) 2017 (Pending) AHR (none) Patient Language Language ENGLISH [13] Hospital Problems as of 2017  Reviewed: 2017  3:12 PM by Jerman Jiang MD  
 None Non-Hospital Problems as of 2017  Reviewed: 2017  3:12 PM by Jerman Jiang MD  
  
  
  
 Class Noted - Resolved Last Modified Active Problems Atrial fibrillation with rapid ventricular response ([address]  2015 - Present 2017 by Nancy Garzon NP Entered by Jerman Jiang MD  
  Overview Addendum 2017  1:19 PM by Nancy Garzon NP  
   2015 PVI afib ablation 4/21/15 ROSLYN with successful cardioversion to SR Uncontrolled hypertension  2015 - Present 2015 by Nancy Garzon NP   Entered by Nancy Garzon NP  
  S/P ablation of atrial fibrillation  2015 - Present 2017 by Nancy Garzon NP  
 Entered by Jc Turner NP Hypertension  Unknown - Present 10/8/2015 by Kelin Wong NP Entered by Kelin Wong NP  
  LBBB (left bundle branch block)  10/17/2017 - Present 10/17/2017 by Amita Norman MD  
  Entered by Amita Norman MD  
  
You are allergic to the following No active allergies Current Discharge Medication List  
  
CONTINUE these medications which have NOT CHANGED Dose & Instructions Dispensing Information Comments  
 dilTIAZem  mg Tb24 tablet Commonly known as:  CARDIZEM LA Dose:  240 mg Take 1 Tab by mouth daily. Quantity:  30 Tab Refills:  11  
   
 GOODY'S BODY PAIN PO Dose:  1 Packet Take 1 Packet by mouth daily as needed for Pain. Refills:  0  
   
 metoprolol tartrate 25 mg tablet Commonly known as:  LOPRESSOR Dose:  12.5 mg Take 0.5 Tabs by mouth two (2) times a day. Quantity:  30 Tab Refills:  0 MULTIVITAMIN PO Dose:  1 Tab Take 1 Tab by mouth daily. Refills:  0  
   
 rivaroxaban 20 mg Tab tablet Commonly known as:  Chris Lee Dose:  20 mg Take 1 Tab by mouth daily. Quantity:  30 Tab Refills:  6 Follow-up Information Follow up With Details Comments Contact Info None   None (395) Patient stated that they have no PCP Discharge Instructions Patient ID: 
Frederick Lawson 
[id number] 
9 y.o. 
1965 Admit Date: 11/29/2017 Discharge Date: 11/29/2017 Admitting Physician: Amita Norman MD  
 
Discharge Physician: Amita Norman MD 
 
Admission Diagnoses: LBBB (left bundle branch block) [I44.7] Essential hypertension [I10] Abnormal stress test [R94.39] History of angina, class III [Z86.79] Discharge Diagnoses: Active Problems: * No active hospital problems. * Discharge Condition: Good Cardiology Procedures this Admission:  Diagnostic left heart catheterization Disposition: home Patient Instructions:  
 
 
Reference discharge instructions provided by nursing for diet and activity. Follow-up with me in 6 wks. Signed: Amita Norman MD 
11/29/2017 5:21 PM 
 
 
Radial Cardiac Catheterization/Angiography Discharge Instructions It is normal to feel tired the first couple days. Take it easy and follow the physicians instructions. CHECK THE CATHETER INSERTION SITE DAILY: 
 
Remove the wrist dressing 24 hours after the procedure. You may shower 24 hours after the procedure. Wash with soap and water and pat dry. Gentle cleaning of the site with soap and water is sufficient, cover with a dry clean dressing or bandage. Do not apply creams or powders to the area. No soaking the wrist for 3 days. Leave the puncture site open to air after 24 hours post-procedure. CALL THE PHYSICIANS:  
 
If the site becomes red, swollen or feels warm to the touch If there is bleeding or drainage or if there is unusual pain at the radial site. If there is any minor oozing, you may apply a band-aid and remove after 12 hours. If the bleeding continues, hold pressure with the middle finger against the puncture site and the thumb against the back of the wrist,call 911 to be transported to the hospital. 
DO NOT DRIVE YOURSELF, [address] ACTIVITY:  
For the first 24 hours do not manipulate the wrist. 
No lifting, pushing or pulling over 3-5 pounds with the affected wrist for 7 daysand no straining the insertion site. Do not life grocery bags or the garbage can, do not run the vacuum  or  for 7 days. Start with short walks as in the hospital and gradually increase as tolerated each day. It is recommended to walk 30 minutes 5-7 days per week. Follow your physicians instructions on activity. Avoid walking outside in extremes of heat or cold. Walk inside when it is cold and windy or hot and humid. Things to keep in mind: 
No driving for at least 24 hours, or as designated by your physician. Limit the number of times you go up and down the stairs Take rests and pace yourself with activity. Be careful and do not strain with bowel movements. MEDICATIONS: 
 
Take all medications as prescribed Call your physician if you have any questions Keep an updated list of your medications with you at all times and give a list to your physician and pharmacist 
 
SIGNS AND SYMPTOMS:  
Be cautious of symptoms of angina or recurrent symptoms such as chest discomfort, unusual shortness of breath or fatigue. These could be symptoms of restenosis, a new blockage or a heart attack. If your symptoms are relieved with rest it is still recommended that you notify your physician of recurrent chest pain or discomfort. For CHEST PAIN or symptoms of angina not relieved with rest:  If the discomfort is not relieved with rest, and you have been prescribed Nitroglycerin, take as directed (taken under the tongue, one at a time 5 minutes apart for a total of 3 doses). If the discomfort is not relieved after the 3rd nitroglycerin, call 911. If you have not been prescribed Nitroglycerin  and your chest discomfort is not relieved with rest, call 911. AFTER CARE:  
Follow up with your physician as instructed. Follow a heart healthy diet with proper portion control, daily stress management, daily exercise, blood pressure and cholesterol control , and smoking cessation. Chart Review Routing History No Routing History on File

## 2017-11-30 NOTE — PROGRESS NOTES
2055-  Assisted out of bed. Ambulated in hallway with nurse. Tolerated well. 2110- Discharge instructions reviewed with pt. Telemetry removed. PIV d/c'd. Pt dressed. Pt ambulated with nurse to front entrance of hospital to private car for home discharge. Pt denied pain. No hematoma or bleeding noted to right radial cath site at time of discharge.

## 2017-12-01 ENCOUNTER — TELEPHONE (OUTPATIENT)
Dept: CARDIOLOGY CLINIC | Age: 52
End: 2017-12-01

## 2017-12-01 RX ORDER — ATORVASTATIN CALCIUM 40 MG/1
TABLET, FILM COATED ORAL AS NEEDED
COMMUNITY
End: 2018-05-30 | Stop reason: SDUPTHER

## 2017-12-01 NOTE — TELEPHONE ENCOUNTER
----- Message from Lele Howe MD sent at 11/28/2017  9:17 AM EST -----  Inform him cholesterol is elevated and should be on statin. Let me know if he is willing to take it and call in lipitor 40 mg daily, disp 30, 4 refills      Called pt,verified pt with two pt identifiers, read out values to pt on labs. Advised him that  would like for him to start Lipitor 40 mg tab nightly. Verified pharmacy and advised him I would call that in for him. Advised him that his elevated cholesterol could be hereditary and advised him of muscle aches with the statin medications. Advised him he could call back with any questions or concerns. He verbalized that he understood everything. Called [facility] pharmacy and called in verbal for Lipitor 40 mg tab by mouth nightly disp 30 tabs refill 4. She verbalized it back to me and advised that she understood everything and would get that ready.

## 2017-12-14 ENCOUNTER — OFFICE VISIT (OUTPATIENT)
Dept: CARDIOLOGY CLINIC | Age: 52
End: 2017-12-14

## 2017-12-14 DIAGNOSIS — I48.91 ATRIAL FIBRILLATION WITH RAPID VENTRICULAR RESPONSE (HCC): Primary | ICD-10-CM

## 2018-01-09 ENCOUNTER — OFFICE VISIT (OUTPATIENT)
Dept: CARDIOLOGY CLINIC | Age: 53
End: 2018-01-09

## 2018-01-09 VITALS
WEIGHT: 181.5 LBS | SYSTOLIC BLOOD PRESSURE: 130 MMHG | OXYGEN SATURATION: 97 % | HEART RATE: 80 BPM | DIASTOLIC BLOOD PRESSURE: 86 MMHG | RESPIRATION RATE: 16 BRPM | BODY MASS INDEX: 26.88 KG/M2 | HEIGHT: 69 IN

## 2018-01-09 DIAGNOSIS — I10 ESSENTIAL HYPERTENSION: Primary | ICD-10-CM

## 2018-01-09 DIAGNOSIS — I10 UNCONTROLLED HYPERTENSION: ICD-10-CM

## 2018-01-09 DIAGNOSIS — E78.2 MIXED HYPERLIPIDEMIA: ICD-10-CM

## 2018-01-09 DIAGNOSIS — I44.7 LBBB (LEFT BUNDLE BRANCH BLOCK): ICD-10-CM

## 2018-01-09 DIAGNOSIS — Z86.79 S/P ABLATION OF ATRIAL FIBRILLATION: ICD-10-CM

## 2018-01-09 DIAGNOSIS — Z98.890 S/P ABLATION OF ATRIAL FIBRILLATION: ICD-10-CM

## 2018-01-09 NOTE — MR AVS SNAPSHOT
Visit Information Date & Time Provider Department Dept. Phone Encounter #  
 1/9/2018 11:30 AM Jaki De León, [address] [facility] [phone] [id number] Follow-up Instructions Return in about 4 months (around 5/9/2018). 1/15/2018  8:00 AM  
REMOTE OFFICE VISIT with [facility] [address] [facility]) Appt Note: NOT A OFFICE VISIT  REMOTE MDT ILR  
 [phone] [facility]  
[phone] [address] [facility] Upcoming Health Maintenance Date Due Hepatitis C Screening 1965 DTaP/Tdap/Td series (1 - Tdap) 6/25/1986 FOBT Q 1 YEAR AGE 50-75 6/25/2015 Influenza Age 5 to Adult 8/1/2017 Allergies as of 1/9/2018  Review Complete On: 1/9/2018 By: Jaki De León MD  
 No Known Allergies Current Immunizations  Never Reviewed No immunizations on file. Not reviewed this visit You Were Diagnosed With   
  
 Codes Comments Essential hypertension    -  Primary ICD-10-CM: I10 
ICD-9-CM: 401.9 Uncontrolled hypertension     ICD-10-CM: I10 
ICD-9-CM: 401.9 LBBB (left bundle branch block)     ICD-10-CM: I44.7 ICD-9-CM: 426. 3 Vitals BP Pulse Resp Height(growth percentile) Weight(growth percentile) SpO2  
 130/86 80 16 5' 9\" (1.753 m) 181 lb 8 oz (82.3 kg) 97% BMI Smoking Status 26.8 kg/m2 Never Smoker Vitals History BMI and BSA Data Body Mass Index Body Surface Area  
 26.8 kg/m 2 2 m 2 Preferred Pharmacy Pharmacy Name Phone [id number] [place], [address] [phone] Your Updated Medication List  
  
   
This list is accurate as of: 1/9/18 12:13 PM.  Always use your most recent med list.  
  
  
  
  
 dilTIAZem  mg Tb24 tablet Commonly known as:  CARDIZEM LA  
 Take 1 Tab by mouth daily. GOODY'S BODY PAIN PO Take 1 Packet by mouth daily as needed for Pain. LIPITOR 40 mg tablet Generic drug:  atorvastatin Take  by mouth as needed. MULTIVITAMIN PO Take 1 Tab by mouth daily. rivaroxaban 20 mg Tab tablet Commonly known as:  Vergie Dioni Take 1 Tab by mouth daily. We Performed the Following AMB POC EKG ROUTINE W/ 12 LEADS, INTER & REP [00330 CPT(R)] Follow-up Instructions Return in about 4 months (around 5/9/2018). Introducing [facility]! Dear Ash Rick: 
Thank you for requesting a [employer] account. Our records indicate that you already have an active [employer] account. You can access your account anytime at [email or web address] Did you know that you can access your hospital and ER discharge instructions at any time in [employer]? You can also review all of your test results from your hospital stay or ER visit. Additional Information If you have questions, please visit the Frequently Asked Questions section of the [employer] website at [email or web address] Remember, [employer] is NOT to be used for urgent needs. For medical emergencies, dial 911. Now available from your iPhone and Android! Please provide this summary of care documentation to your next provider. Your primary care clinician is listed as NONE. If you have any questions after today's visit, please call [phone].

## 2018-01-09 NOTE — PROGRESS NOTES
1/9/2018 12:12 PM      Subjective:     Ish Armstrong is here for f/u visit. He denies chest pain, chest pressure/discomfort, dyspnea, palpitations, irregular heart beats, near-syncope, syncope, fatigue, orthopnea, paroxysmal nocturnal dyspnea, exertional chest pressure/discomfort, claudication, lower extremity edema. Visit Vitals    /86    Pulse 80    Resp 16    Ht 5' 9\" (1.753 m)    Wt 181 lb 8 oz (82.3 kg)    SpO2 97%    BMI 26.8 kg/m2     Current Outpatient Prescriptions   Medication Sig    atorvastatin (LIPITOR) 40 mg tablet Take  by mouth as needed.  ASPIRIN/ACETAMINOPHEN (GOODY'S BODY PAIN PO) Take 1 Packet by mouth daily as needed for Pain.  rivaroxaban (XARELTO) 20 mg tab tablet Take 1 Tab by mouth daily.  dilTIAZem ER (CARDIZEM LA) 240 mg Tb24 tablet Take 1 Tab by mouth daily.  MULTIVITAMIN PO Take 1 Tab by mouth daily. No current facility-administered medications for this visit. Objective:      Visit Vitals    /86    Pulse 80    Resp 16    Ht 5' 9\" (1.753 m)    Wt 181 lb 8 oz (82.3 kg)    SpO2 97%    BMI 26.8 kg/m2       Data Review:     EKG: Normal sinus rhythm, LBBB, unchanged    Reviewed and/or ordered active problem list, medication list tests    Past Medical History:   Diagnosis Date    A-fib ([address]     CAD (coronary artery disease)     a fib    Hypertension     S/P ablation of atrial fibrillation 7/2/2015    Uncontrolled hypertension 4/21/2015      Past Surgical History:   Procedure Laterality Date    CARDIAC SURG PROCEDURE UNLIST      Cauterized parts of his heart    CARDIOVERSION, ELECTIVE  4/21/2015          No Known Allergies   Family History   Problem Relation Age of Onset    Family history unknown: Yes      Social History     Social History    Marital status: SINGLE     Spouse name: N/A    Number of children: N/A    Years of education: N/A     Occupational History    Not on file.      Social History Main Topics    Smoking status: Never Smoker    Smokeless tobacco: Never Used    Alcohol use 4.2 oz/week     7 Glasses of wine per week      Comment: several drinks a day    Drug use: No    Sexual activity: Not on file     Other Topics Concern    Not on file     Social History Narrative         Review of Systems     General: Not Present- Anorexia, Chills, Dietary Changes, Fatigue, Fever, Medication Changes, Night Sweats, Weight Gain > 10lbs. and Weight Loss > 10lbs. .  Skin: Not Present- Bruising and Excessive Sweating. HEENT: Not Present- Headache, Visual Loss and Vertigo. Respiratory: Not Present- Cough, Decreased Exercise Tolerance, Difficulty Breathing, Snoring and Wheezing. Cardiovascular: Not Present- Abnormal Blood Pressure, Chest Pain, Claudications, Difficulty Breathing On Exertion, Edema, Fainting / Blacking Out, Irregular Heart Beat, Night Cramps, Orthopnea, Palpitations, Paroxysmal Nocturnal Dyspnea, Rapid Heart Rate, Shortness of Breath and Swelling of Extremities. Gastrointestinal: Not Present- Black, Tarry Stool, Bloody Stool, Diarrhea, Hematemesis, Rectal Bleeding and Vomiting. Musculoskeletal: Not Present- Muscle Pain and Muscle Weakness. Neurological: Not Present- Dizziness. Psychiatric: Not Present- Depression. Endocrine: Not Present- Cold Intolerance, Heat Intolerance and Thyroid Problems. Hematology: Not Present- Abnormal Bleeding, Anemia, Blood Clots and Easy Bruising.       Physical Exam   The physical exam findings are as follows:       General   Mental Status - Alert. General Appearance - Not in acute distress. Chest and Lung Exam   Inspection: Accessory muscles - No use of accessory muscles in breathing. Auscultation:   Breath sounds: - Normal.      Cardiovascular   Inspection: Jugular vein - Bilateral - Inspection Normal.  Palpation/Percussion:   Apical Impulse: - Normal.  Auscultation: Rhythm - Regular. Heart Sounds - S1 WNL and S2 WNL. No S3 or S4.   Murmurs & Other Heart Sounds: Auscultation of the heart reveals - No Murmurs. Carotid arteries - No Carotid bruit. Peripheral Vascular   Upper Extremity: Inspection - Bilateral - No Cyanotic nailbeds or Digital clubbing. Lower Extremity:   Palpation: Edema - Bilateral - No edema. Assessment:       ICD-10-CM ICD-9-CM    1. Essential hypertension I10 401.9    2. Uncontrolled hypertension I10 401.9 AMB POC EKG ROUTINE W/ 12 LEADS, INTER & REP   3. LBBB (left bundle branch block) I44.7 426.3    4. Mixed hyperlipidemia E78.2 272.2    5. S/P ablation of atrial fibrillation Z98.890 V45.89     Z86.79         Plan:     1. PAF: s/p ROSLYN and DCCV 09/2017. Remains in sinus rhythm. Continue cardizem and NOAC. S/p ablation in 2015. Wants to stop NOAC due to concern of bleed. D/w pt. Option of watchman d/w pt. He will call back. 2. HTN: controlled. Monitor BP at home. 3. Hyperlipidemia: on statin now. Recheck labs with next visit.

## 2018-01-09 NOTE — PROGRESS NOTES
1. Have you been to the ER, urgent care clinic since your last visit? Hospitalized since your last visit? Yes When: ED [facility] 11/17 abnormal heart beat    2. Have you seen or consulted any other health care providers outside of the [facility] since your last visit? Include any pap smears or colon screening. No     Patient C/O occassional irregular heart beats.

## 2018-01-15 ENCOUNTER — OFFICE VISIT (OUTPATIENT)
Dept: CARDIOLOGY CLINIC | Age: 53
End: 2018-01-15

## 2018-01-15 DIAGNOSIS — I48.91 ATRIAL FIBRILLATION WITH RAPID VENTRICULAR RESPONSE (HCC): Primary | ICD-10-CM

## 2018-02-16 ENCOUNTER — CLINICAL SUPPORT (OUTPATIENT)
Dept: CARDIOLOGY CLINIC | Age: 53
End: 2018-02-16

## 2018-02-16 DIAGNOSIS — I48.91 ATRIAL FIBRILLATION WITH RAPID VENTRICULAR RESPONSE (HCC): Primary | ICD-10-CM

## 2018-03-19 ENCOUNTER — CLINICAL SUPPORT (OUTPATIENT)
Dept: CARDIOLOGY CLINIC | Age: 53
End: 2018-03-19

## 2018-03-19 DIAGNOSIS — I48.91 ATRIAL FIBRILLATION WITH RAPID VENTRICULAR RESPONSE (HCC): Primary | ICD-10-CM

## 2018-04-19 ENCOUNTER — CLINICAL SUPPORT (OUTPATIENT)
Dept: CARDIOLOGY CLINIC | Age: 53
End: 2018-04-19

## 2018-04-19 DIAGNOSIS — I48.91 ATRIAL FIBRILLATION WITH RAPID VENTRICULAR RESPONSE (HCC): Primary | ICD-10-CM

## 2018-05-23 ENCOUNTER — CLINICAL SUPPORT (OUTPATIENT)
Dept: CARDIOLOGY CLINIC | Age: 53
End: 2018-05-23

## 2018-05-23 DIAGNOSIS — I48.91 ATRIAL FIBRILLATION WITH RAPID VENTRICULAR RESPONSE (HCC): ICD-10-CM

## 2018-05-23 DIAGNOSIS — Z98.890 S/P ABLATION OF ATRIAL FIBRILLATION: Primary | ICD-10-CM

## 2018-05-23 DIAGNOSIS — Z45.09 ENCOUNTER FOR LOOP RECORDER CHECK: ICD-10-CM

## 2018-05-23 DIAGNOSIS — Z86.79 S/P ABLATION OF ATRIAL FIBRILLATION: Primary | ICD-10-CM

## 2018-05-30 RX ORDER — ATORVASTATIN CALCIUM 40 MG/1
TABLET, FILM COATED ORAL
Qty: 30 TAB | Refills: 4 | Status: SHIPPED | OUTPATIENT
Start: 2018-05-30

## 2018-06-25 ENCOUNTER — CLINICAL SUPPORT (OUTPATIENT)
Dept: CARDIOLOGY CLINIC | Age: 53
End: 2018-06-25

## 2018-06-25 DIAGNOSIS — I48.91 ATRIAL FIBRILLATION WITH RAPID VENTRICULAR RESPONSE (HCC): Primary | ICD-10-CM

## 2018-06-25 DIAGNOSIS — Z86.79 S/P ABLATION OF ATRIAL FIBRILLATION: ICD-10-CM

## 2018-06-25 DIAGNOSIS — Z45.09 ENCOUNTER FOR LOOP RECORDER CHECK: ICD-10-CM

## 2018-06-25 DIAGNOSIS — Z98.890 S/P ABLATION OF ATRIAL FIBRILLATION: ICD-10-CM

## 2018-07-26 ENCOUNTER — CLINICAL SUPPORT (OUTPATIENT)
Dept: CARDIOLOGY CLINIC | Age: 53
End: 2018-07-26

## 2018-07-26 DIAGNOSIS — Z86.79 S/P ABLATION OF ATRIAL FIBRILLATION: ICD-10-CM

## 2018-07-26 DIAGNOSIS — Z45.09 ENCOUNTER FOR LOOP RECORDER CHECK: ICD-10-CM

## 2018-07-26 DIAGNOSIS — Z98.890 S/P ABLATION OF ATRIAL FIBRILLATION: ICD-10-CM

## 2018-07-26 DIAGNOSIS — I48.91 ATRIAL FIBRILLATION WITH RAPID VENTRICULAR RESPONSE (HCC): Primary | ICD-10-CM

## 2018-08-29 ENCOUNTER — TELEPHONE (OUTPATIENT)
Dept: CARDIOLOGY CLINIC | Age: 53
End: 2018-08-29

## 2018-08-29 NOTE — TELEPHONE ENCOUNTER
Left voicemail to return call. We R [employer] ILR battery is EOL and no longer transmitting. Dr. Ashly Lassiter does not plan to reimplant.  Patient is to contact us if any further symptoms

## 2019-03-01 ENCOUNTER — APPOINTMENT (RX ONLY)
Dept: URBAN - METROPOLITAN AREA CLINIC 147 | Facility: CLINIC | Age: 54
Setting detail: DERMATOLOGY
End: 2019-03-01

## 2021-05-29 ENCOUNTER — RECORDS - HEALTHEAST (OUTPATIENT)
Dept: ADMINISTRATIVE | Facility: CLINIC | Age: 56
End: 2021-05-29

## 2021-08-03 PROBLEM — I10 HYPERTENSION: Status: RESOLVED | Noted: 2021-08-03 | Resolved: 2021-08-03

## 2022-11-19 ENCOUNTER — HOSPITAL ENCOUNTER (OUTPATIENT)
Age: 57
Setting detail: OBSERVATION
Discharge: HOME | End: 2022-11-20
Attending: EMERGENCY MEDICINE | Admitting: STUDENT IN AN ORGANIZED HEALTH CARE EDUCATION/TRAINING PROGRAM
Payer: COMMERCIAL

## 2022-11-19 ENCOUNTER — APPOINTMENT (OUTPATIENT)
Dept: GENERAL RADIOLOGY | Age: 57
End: 2022-11-19
Attending: EMERGENCY MEDICINE
Payer: COMMERCIAL

## 2022-11-19 DIAGNOSIS — I48.0 PAROXYSMAL ATRIAL FIBRILLATION (HCC): Primary | ICD-10-CM

## 2022-11-19 DIAGNOSIS — R00.2 PALPITATIONS: ICD-10-CM

## 2022-11-19 PROBLEM — I48.91 ATRIAL FIBRILLATION WITH RVR (HCC): Status: ACTIVE | Noted: 2022-11-19

## 2022-11-19 LAB
ALBUMIN SERPL-MCNC: 3.9 G/DL (ref 3.5–5)
ALBUMIN/GLOB SERPL: 1.1 {RATIO} (ref 1.1–2.2)
ALP SERPL-CCNC: 65 U/L (ref 45–117)
ALT SERPL-CCNC: 32 U/L (ref 12–78)
ANION GAP SERPL CALC-SCNC: 5 MMOL/L (ref 5–15)
AST SERPL-CCNC: 16 U/L (ref 15–37)
BASOPHILS # BLD: 0.1 K/UL (ref 0–0.1)
BASOPHILS NFR BLD: 1 % (ref 0–1)
BILIRUB SERPL-MCNC: 0.6 MG/DL (ref 0.2–1)
BNP SERPL-MCNC: 1637 PG/ML
BUN SERPL-MCNC: 17 MG/DL (ref 6–20)
BUN/CREAT SERPL: 17 (ref 12–20)
CALCIUM SERPL-MCNC: 9.1 MG/DL (ref 8.5–10.1)
CHLORIDE SERPL-SCNC: 103 MMOL/L (ref 97–108)
CO2 SERPL-SCNC: 28 MMOL/L (ref 21–32)
CREAT SERPL-MCNC: 0.99 MG/DL (ref 0.7–1.3)
DIFFERENTIAL METHOD BLD: ABNORMAL
EOSINOPHIL # BLD: 0.7 K/UL (ref 0–0.4)
EOSINOPHIL NFR BLD: 6 % (ref 0–7)
ERYTHROCYTE [DISTWIDTH] IN BLOOD BY AUTOMATED COUNT: 12.6 % (ref 11.5–14.5)
GLOBULIN SER CALC-MCNC: 3.6 G/DL (ref 2–4)
GLUCOSE SERPL-MCNC: 163 MG/DL (ref 65–100)
HCT VFR BLD AUTO: 45.5 % (ref 36.6–50.3)
HGB BLD-MCNC: 15.9 G/DL (ref 12.1–17)
IMM GRANULOCYTES # BLD AUTO: 0.1 K/UL (ref 0–0.04)
IMM GRANULOCYTES NFR BLD AUTO: 0 % (ref 0–0.5)
LYMPHOCYTES # BLD: 3.4 K/UL (ref 0.8–3.5)
LYMPHOCYTES NFR BLD: 28 % (ref 12–49)
MAGNESIUM SERPL-MCNC: 2.2 MG/DL (ref 1.6–2.4)
MCH RBC QN AUTO: 31.9 PG (ref 26–34)
MCHC RBC AUTO-ENTMCNC: 34.9 G/DL (ref 30–36.5)
MCV RBC AUTO: 91.2 FL (ref 80–99)
MONOCYTES # BLD: 1.2 K/UL (ref 0–1)
MONOCYTES NFR BLD: 10 % (ref 5–13)
NEUTS SEG # BLD: 6.8 K/UL (ref 1.8–8)
NEUTS SEG NFR BLD: 55 % (ref 32–75)
NRBC # BLD: 0 K/UL (ref 0–0.01)
NRBC BLD-RTO: 0 PER 100 WBC
PLATELET # BLD AUTO: 337 K/UL (ref 150–400)
PMV BLD AUTO: 10.5 FL (ref 8.9–12.9)
POTASSIUM SERPL-SCNC: 3.7 MMOL/L (ref 3.5–5.1)
PROT SERPL-MCNC: 7.5 G/DL (ref 6.4–8.2)
RBC # BLD AUTO: 4.99 M/UL (ref 4.1–5.7)
SODIUM SERPL-SCNC: 136 MMOL/L (ref 136–145)
TROPONIN-HIGH SENSITIVITY: 8 NG/L (ref 0–76)
WBC # BLD AUTO: 12.1 K/UL (ref 4.1–11.1)

## 2022-11-19 PROCEDURE — 83735 ASSAY OF MAGNESIUM: CPT

## 2022-11-19 PROCEDURE — 83036 HEMOGLOBIN GLYCOSYLATED A1C: CPT

## 2022-11-19 PROCEDURE — 74011250637 HC RX REV CODE- 250/637: Performed by: STUDENT IN AN ORGANIZED HEALTH CARE EDUCATION/TRAINING PROGRAM

## 2022-11-19 PROCEDURE — 85025 COMPLETE CBC W/AUTO DIFF WBC: CPT

## 2022-11-19 PROCEDURE — 99285 EMERGENCY DEPT VISIT HI MDM: CPT

## 2022-11-19 PROCEDURE — 96365 THER/PROPH/DIAG IV INF INIT: CPT

## 2022-11-19 PROCEDURE — 71046 X-RAY EXAM CHEST 2 VIEWS: CPT

## 2022-11-19 PROCEDURE — 74011000250 HC RX REV CODE- 250: Performed by: EMERGENCY MEDICINE

## 2022-11-19 PROCEDURE — 80053 COMPREHEN METABOLIC PANEL: CPT

## 2022-11-19 PROCEDURE — G0378 HOSPITAL OBSERVATION PER HR: HCPCS

## 2022-11-19 PROCEDURE — 93005 ELECTROCARDIOGRAM TRACING: CPT

## 2022-11-19 PROCEDURE — 83880 ASSAY OF NATRIURETIC PEPTIDE: CPT

## 2022-11-19 PROCEDURE — 94761 N-INVAS EAR/PLS OXIMETRY MLT: CPT

## 2022-11-19 PROCEDURE — 74011250636 HC RX REV CODE- 250/636: Performed by: EMERGENCY MEDICINE

## 2022-11-19 PROCEDURE — 36415 COLL VENOUS BLD VENIPUNCTURE: CPT

## 2022-11-19 PROCEDURE — 84484 ASSAY OF TROPONIN QUANT: CPT

## 2022-11-19 PROCEDURE — 65270000029 HC RM PRIVATE

## 2022-11-19 PROCEDURE — 80061 LIPID PANEL: CPT

## 2022-11-19 RX ORDER — ACETAMINOPHEN 650 MG/1
650 SUPPOSITORY RECTAL
Status: DISCONTINUED | OUTPATIENT
Start: 2022-11-19 | End: 2022-11-20 | Stop reason: HOSPADM

## 2022-11-19 RX ORDER — ONDANSETRON 2 MG/ML
4 INJECTION INTRAMUSCULAR; INTRAVENOUS
Status: DISCONTINUED | OUTPATIENT
Start: 2022-11-19 | End: 2022-11-20 | Stop reason: HOSPADM

## 2022-11-19 RX ORDER — ONDANSETRON 4 MG/1
4 TABLET, ORALLY DISINTEGRATING ORAL
Status: DISCONTINUED | OUTPATIENT
Start: 2022-11-19 | End: 2022-11-20 | Stop reason: HOSPADM

## 2022-11-19 RX ORDER — POLYETHYLENE GLYCOL 3350 17 G/17G
17 POWDER, FOR SOLUTION ORAL DAILY PRN
Status: DISCONTINUED | OUTPATIENT
Start: 2022-11-19 | End: 2022-11-20 | Stop reason: HOSPADM

## 2022-11-19 RX ORDER — SODIUM CHLORIDE 0.9 % (FLUSH) 0.9 %
5-40 SYRINGE (ML) INJECTION AS NEEDED
Status: DISCONTINUED | OUTPATIENT
Start: 2022-11-19 | End: 2022-11-20 | Stop reason: HOSPADM

## 2022-11-19 RX ORDER — ACETAMINOPHEN 325 MG/1
650 TABLET ORAL
Status: DISCONTINUED | OUTPATIENT
Start: 2022-11-19 | End: 2022-11-20 | Stop reason: HOSPADM

## 2022-11-19 RX ORDER — SODIUM CHLORIDE 0.9 % (FLUSH) 0.9 %
5-40 SYRINGE (ML) INJECTION EVERY 8 HOURS
Status: DISCONTINUED | OUTPATIENT
Start: 2022-11-19 | End: 2022-11-20 | Stop reason: HOSPADM

## 2022-11-19 RX ORDER — DILTIAZEM HYDROCHLORIDE 60 MG/1
60 TABLET, FILM COATED ORAL EVERY 6 HOURS
Status: DISCONTINUED | OUTPATIENT
Start: 2022-11-19 | End: 2022-11-20 | Stop reason: HOSPADM

## 2022-11-19 RX ADMIN — DILTIAZEM HYDROCHLORIDE 60 MG: 60 TABLET, FILM COATED ORAL at 22:38

## 2022-11-19 RX ADMIN — DEXTROSE MONOHYDRATE 5 MG/HR: 50 INJECTION, SOLUTION INTRAVENOUS at 21:46

## 2022-11-20 VITALS
BODY MASS INDEX: 27.33 KG/M2 | HEART RATE: 93 BPM | RESPIRATION RATE: 15 BRPM | OXYGEN SATURATION: 97 % | TEMPERATURE: 98.2 F | SYSTOLIC BLOOD PRESSURE: 148 MMHG | HEIGHT: 69 IN | DIASTOLIC BLOOD PRESSURE: 79 MMHG | WEIGHT: 184.53 LBS

## 2022-11-20 PROBLEM — I48.91 ATRIAL FIBRILLATION WITH RVR (HCC): Status: RESOLVED | Noted: 2022-11-19 | Resolved: 2022-11-20

## 2022-11-20 LAB
ANION GAP SERPL CALC-SCNC: 7 MMOL/L (ref 5–15)
ATRIAL RATE: 93 BPM
BASOPHILS # BLD: 0.1 K/UL (ref 0–0.1)
BASOPHILS NFR BLD: 1 % (ref 0–1)
BUN SERPL-MCNC: 18 MG/DL (ref 6–20)
BUN/CREAT SERPL: 21 (ref 12–20)
CALCIUM SERPL-MCNC: 9 MG/DL (ref 8.5–10.1)
CALCULATED R AXIS, ECG10: -69 DEGREES
CALCULATED T AXIS, ECG11: 99 DEGREES
CHLORIDE SERPL-SCNC: 105 MMOL/L (ref 97–108)
CHOLEST SERPL-MCNC: 264 MG/DL
CO2 SERPL-SCNC: 26 MMOL/L (ref 21–32)
CREAT SERPL-MCNC: 0.84 MG/DL (ref 0.7–1.3)
DIAGNOSIS, 93000: NORMAL
DIFFERENTIAL METHOD BLD: ABNORMAL
EOSINOPHIL # BLD: 0.5 K/UL (ref 0–0.4)
EOSINOPHIL NFR BLD: 5 % (ref 0–7)
ERYTHROCYTE [DISTWIDTH] IN BLOOD BY AUTOMATED COUNT: 12.5 % (ref 11.5–14.5)
EST. AVERAGE GLUCOSE BLD GHB EST-MCNC: 100 MG/DL
GLUCOSE SERPL-MCNC: 113 MG/DL (ref 65–100)
HBA1C MFR BLD: 5.1 % (ref 4–5.6)
HCT VFR BLD AUTO: 45.2 % (ref 36.6–50.3)
HDLC SERPL-MCNC: 64 MG/DL
HDLC SERPL: 4.1 {RATIO} (ref 0–5)
HGB BLD-MCNC: 15.3 G/DL (ref 12.1–17)
IMM GRANULOCYTES # BLD AUTO: 0 K/UL (ref 0–0.04)
IMM GRANULOCYTES NFR BLD AUTO: 0 % (ref 0–0.5)
LDLC SERPL CALC-MCNC: 158.4 MG/DL (ref 0–100)
LYMPHOCYTES # BLD: 2.2 K/UL (ref 0.8–3.5)
LYMPHOCYTES NFR BLD: 22 % (ref 12–49)
MCH RBC QN AUTO: 31.3 PG (ref 26–34)
MCHC RBC AUTO-ENTMCNC: 33.8 G/DL (ref 30–36.5)
MCV RBC AUTO: 92.4 FL (ref 80–99)
MONOCYTES # BLD: 1 K/UL (ref 0–1)
MONOCYTES NFR BLD: 10 % (ref 5–13)
NEUTS SEG # BLD: 6.4 K/UL (ref 1.8–8)
NEUTS SEG NFR BLD: 62 % (ref 32–75)
NRBC # BLD: 0 K/UL (ref 0–0.01)
NRBC BLD-RTO: 0 PER 100 WBC
PLATELET # BLD AUTO: 315 K/UL (ref 150–400)
PMV BLD AUTO: 11.1 FL (ref 8.9–12.9)
POTASSIUM SERPL-SCNC: 3.6 MMOL/L (ref 3.5–5.1)
Q-T INTERVAL, ECG07: 298 MS
QRS DURATION, ECG06: 124 MS
QTC CALCULATION (BEZET), ECG08: 421 MS
RBC # BLD AUTO: 4.89 M/UL (ref 4.1–5.7)
SODIUM SERPL-SCNC: 138 MMOL/L (ref 136–145)
TRIGL SERPL-MCNC: 208 MG/DL (ref ?–150)
VENTRICULAR RATE, ECG03: 120 BPM
VLDLC SERPL CALC-MCNC: 41.6 MG/DL
WBC # BLD AUTO: 10.3 K/UL (ref 4.1–11.1)

## 2022-11-20 PROCEDURE — 74011000250 HC RX REV CODE- 250: Performed by: STUDENT IN AN ORGANIZED HEALTH CARE EDUCATION/TRAINING PROGRAM

## 2022-11-20 PROCEDURE — 36415 COLL VENOUS BLD VENIPUNCTURE: CPT

## 2022-11-20 PROCEDURE — G0378 HOSPITAL OBSERVATION PER HR: HCPCS

## 2022-11-20 PROCEDURE — 74011250637 HC RX REV CODE- 250/637: Performed by: STUDENT IN AN ORGANIZED HEALTH CARE EDUCATION/TRAINING PROGRAM

## 2022-11-20 PROCEDURE — 85025 COMPLETE CBC W/AUTO DIFF WBC: CPT

## 2022-11-20 PROCEDURE — 80048 BASIC METABOLIC PNL TOTAL CA: CPT

## 2022-11-20 RX ORDER — DILTIAZEM HYDROCHLORIDE 60 MG/1
60 TABLET, FILM COATED ORAL EVERY 6 HOURS
Qty: 180 TABLET | Refills: 0 | Status: SHIPPED | OUTPATIENT
Start: 2022-11-20

## 2022-11-20 RX ADMIN — DILTIAZEM HYDROCHLORIDE 60 MG: 60 TABLET, FILM COATED ORAL at 05:35

## 2022-11-20 RX ADMIN — SODIUM CHLORIDE, PRESERVATIVE FREE 10 ML: 5 INJECTION INTRAVENOUS at 05:36

## 2022-11-20 RX ADMIN — DILTIAZEM HYDROCHLORIDE 60 MG: 60 TABLET, FILM COATED ORAL at 11:38

## 2022-11-20 RX ADMIN — SODIUM CHLORIDE, PRESERVATIVE FREE 10 ML: 5 INJECTION INTRAVENOUS at 01:19

## 2022-11-20 NOTE — PROGRESS NOTES
Problem: Falls - Risk of  Goal: *Absence of Falls  Description: Document Darlen Dorchester Fall Risk and appropriate interventions in the flowsheet.   Outcome: Progressing Towards Goal  Note: Fall Risk Interventions:            Medication Interventions: Bed/chair exit alarm, Evaluate medications/consider consulting pharmacy, Patient to call before getting OOB, Teach patient to arise slowly                   Problem: Afib Pathway: Day 1  Goal: Activity/Safety  Outcome: Progressing Towards Goal  Goal: Diagnostic Test/Procedures  Outcome: Progressing Towards Goal  Goal: Nutrition/Diet  Outcome: Progressing Towards Goal  Goal: Medications  Outcome: Progressing Towards Goal  Goal: Respiratory  Outcome: Progressing Towards Goal  Goal: Treatments/Interventions/Procedures  Outcome: Progressing Towards Goal  Goal: Psychosocial  Outcome: Progressing Towards Goal  Goal: *Optimal pain control at patient's stated goal  Outcome: Progressing Towards Goal  Goal: *Hemodynamically stable  Outcome: Progressing Towards Goal  Goal: *Stable cardiac rhythm  Outcome: Progressing Towards Goal  Goal: *Lungs clear or at baseline  Outcome: Progressing Towards Goal  Goal: *Labs within defined limits  Outcome: Progressing Towards Goal

## 2022-11-20 NOTE — ED PROVIDER NOTES
EMERGENCY DEPARTMENT HISTORY AND PHYSICAL EXAM      Date: 11/19/2022  Patient Name: Flavio Caceres    History of Presenting Illness     Chief Complaint   Patient presents with    Palpitations     ED visit d/t palpitation - hx of Afib, seen by Tylor Andrews MD 3 years ago with no recent visit - onset of sxs, 11/18 2300 - seen at [facility] and seen yet left AMA due to long wait, during the stay, was there for nearly 18 hours, pt reports having IVP Cardizem then started on  Cardizem drip / VS improved was pending reeval however no provider spoke with patient since 1500, also reports being given xarelto - reports reoccurring palpitations leading to ED visit at [facility];;        History Provided By: Patient    HPI: Flavio Caceres, 62 y.o. male presents to the ED with cc of palpitations. 30-year-old male with history of atrial fibrillation not on anticoagulation presents to the emergency department with palpitations. Patient reports symptoms began 1 day ago. Patient reports feeling of palpitations, no chest pain or shortness of breath. Went to outside hospital ER. There was started on diltiazem IV. Patient reportedly had an echo w/ bubble study and diltiazem drip was stopped and he was transitioned to oral medications. Patient reports his heart rate skyrocketed again as well as his blood pressure and presented to the ED. Patient endorses severe palpitations without alleviating or aggravating factors. There are no other complaints, changes, or physical findings at this time. PCP: None    No current facility-administered medications on file prior to encounter. Current Outpatient Medications on File Prior to Encounter   Medication Sig Dispense Refill    ASPIRIN/ACETAMINOPHEN (GOODY'S BODY PAIN PO) Take 1 Packet by mouth daily as needed for Pain.       [DISCONTINUED] atorvastatin (LIPITOR) 40 mg tablet TAKE 1 TABLET BY MOUTH NIGHTLY EVERY EVENING 30 Tab 4    [DISCONTINUED] MULTIVITAMIN PO Take 1 Tab by mouth daily. [DISCONTINUED] rivaroxaban (XARELTO) 20 mg tab tablet Take 1 Tab by mouth daily. 30 Tab 6    [DISCONTINUED] dilTIAZem ER (CARDIZEM LA) 240 mg Tb24 tablet Take 1 Tab by mouth daily. 27 Tab 11       Past History     Past Medical History:  Past Medical History:   Diagnosis Date    A-fib ([address]     CAD (coronary artery disease)     a fib    Hypertension     S/P ablation of atrial fibrillation 7/2/2015    Uncontrolled hypertension 4/21/2015       Past Surgical History:  Past Surgical History:   Procedure Laterality Date    CARDIAC SURG PROCEDURE UNLIST      Cauterized parts of his heart    CARDIOVERSION, ELECTIVE  4/21/2015            Family History:  Family History   Family history unknown: Yes       Social History:  Social History     Tobacco Use    Smoking status: Never    Smokeless tobacco: Never   Substance Use Topics    Alcohol use: Yes     Alcohol/week: 7.0 standard drinks     Types: 7 Glasses of wine per week     Comment: several drinks a day    Drug use: No       Allergies:  No Known Allergies      Review of Systems   Review of Systems   Constitutional:  Negative for chills and fever. HENT:  Negative for voice change. Eyes:  Negative for pain and redness. Respiratory:  Negative for cough and chest tightness. Cardiovascular:  Positive for palpitations. Negative for chest pain and leg swelling. Gastrointestinal:  Negative for abdominal pain, diarrhea, nausea and vomiting. Genitourinary:  Negative for hematuria. Musculoskeletal:  Negative for gait problem. Skin:  Negative for color change, pallor and rash. Neurological:  Negative for facial asymmetry, weakness and headaches. Hematological:  Does not bruise/bleed easily. Psychiatric/Behavioral:  Negative for behavioral problems. All other systems reviewed and are negative. Physical Exam   Physical Exam  Vitals and nursing note reviewed.    Constitutional:       Comments: 49-year-old male, resting bed, no acute distress HENT:      Head: Normocephalic and atraumatic. Nose: Nose normal.      Mouth/Throat:      Mouth: Mucous membranes are moist.   Eyes:      Pupils: Pupils are equal, round, and reactive to light. Cardiovascular:      Rate and Rhythm: Tachycardia present. Rhythm irregular. Pulses: Normal pulses. Heart sounds: No murmur heard. No friction rub. No gallop. Pulmonary:      Effort: Pulmonary effort is normal.      Breath sounds: Normal breath sounds. No wheezing, rhonchi or rales. Abdominal:      General: Abdomen is flat. There is no distension. Palpations: Abdomen is soft. Tenderness: There is no abdominal tenderness. Musculoskeletal:         General: Normal range of motion. Cervical back: Normal range of motion. Right lower leg: No edema. Left lower leg: No edema. Skin:     General: Skin is warm and dry. Capillary Refill: Capillary refill takes less than 2 seconds. Neurological:      General: No focal deficit present. Mental Status: He is alert. Psychiatric:         Mood and Affect: Mood normal.       Diagnostic Study Results     Labs -     Recent Results (from the past 12 hour(s))   CBC WITH AUTOMATED DIFF    Collection Time: 11/19/22  8:50 PM   Result Value Ref Range    WBC 12.1 (H) 4.1 - 11.1 K/uL    RBC 4.99 4. 10 - 5.70 M/uL    HGB 15.9 12.1 - 17.0 g/dL    HCT 45.5 36.6 - 50.3 %    MCV 91.2 80.0 - 99.0 FL    MCH 31.9 26.0 - 34.0 PG    MCHC 34.9 30.0 - 36.5 g/dL    RDW 12.6 11.5 - 14.5 %    PLATELET 629 038 - 293 K/uL    MPV 10.5 8.9 - 12.9 FL    NRBC 0.0 0  WBC    ABSOLUTE NRBC 0.00 0.00 - 0.01 K/uL    NEUTROPHILS 55 32 - 75 %    LYMPHOCYTES 28 12 - 49 %    MONOCYTES 10 5 - 13 %    EOSINOPHILS 6 0 - 7 %    BASOPHILS 1 0 - 1 %    IMMATURE GRANULOCYTES 0 0.0 - 0.5 %    ABS. NEUTROPHILS 6.8 1.8 - 8.0 K/UL    ABS. LYMPHOCYTES 3.4 0.8 - 3.5 K/UL    ABS. MONOCYTES 1.2 (H) 0.0 - 1.0 K/UL    ABS. EOSINOPHILS 0.7 (H) 0.0 - 0.4 K/UL    ABS.  BASOPHILS 0.1 0.0 - 0.1 K/UL    ABS. IMM. GRANS. 0.1 (H) 0.00 - 0.04 K/UL    DF AUTOMATED     METABOLIC PANEL, COMPREHENSIVE    Collection Time: 11/19/22  8:50 PM   Result Value Ref Range    Sodium 136 136 - 145 mmol/L    Potassium 3.7 3.5 - 5.1 mmol/L    Chloride 103 97 - 108 mmol/L    CO2 28 21 - 32 mmol/L    Anion gap 5 5 - 15 mmol/L    Glucose 163 (H) 65 - 100 mg/dL    BUN 17 6 - 20 MG/DL    Creatinine 0.99 0.70 - 1.30 MG/DL    BUN/Creatinine ratio 17 12 - 20      eGFR >60 >60 ml/min/1.73m2    Calcium 9.1 8.5 - 10.1 MG/DL    Bilirubin, total 0.6 0.2 - 1.0 MG/DL    ALT (SGPT) 32 12 - 78 U/L    AST (SGOT) 16 15 - 37 U/L    Alk. phosphatase 65 45 - 117 U/L    Protein, total 7.5 6.4 - 8.2 g/dL    Albumin 3.9 3.5 - 5.0 g/dL    Globulin 3.6 2.0 - 4.0 g/dL    A-G Ratio 1.1 1.1 - 2.2     TROPONIN-HIGH SENSITIVITY    Collection Time: 11/19/22  8:50 PM   Result Value Ref Range    Troponin-High Sensitivity 8 0 - 76 ng/L   NT-PRO BNP    Collection Time: 11/19/22  8:50 PM   Result Value Ref Range    NT pro-BNP 1,637 (H) <125 PG/ML       Radiologic Studies -   XR CHEST PA LAT   Final Result      Normal PA and lateral chest views. CT Results  (Last 48 hours)      None          CXR Results  (Last 48 hours)                 11/19/22 2115  XR CHEST PA LAT Final result    Impression:      Normal PA and lateral chest views. Narrative:  EXAM: XR CHEST PA LAT       INDICATION: Shortness of breath       COMPARISON: September 27, 2017       TECHNIQUE: PA and lateral chest views       FINDINGS: The cardiac size is within normal limits. The pulmonary vasculature is   within normal limits. Loop recorder       The lungs and pleural spaces are clear. The visualized bones and upper abdomen   are age-appropriate. Medical Decision Making   I am the first provider for this patient.     I reviewed the vital signs, available nursing notes, past medical history, past surgical history, family history and social history. Vital Signs-Reviewed the patient's vital signs. Patient Vitals for the past 12 hrs:   Temp Pulse Resp BP SpO2   11/19/22 2216 -- 88 14 -- 96 %   11/19/22 2155 -- (!) 101 13 -- 95 %   11/19/22 2122 -- 98 17 -- 97 %   11/19/22 2045 98.7 °F (37.1 °C) 70 20 (!) 209/169 97 %     Records Reviewed: Nursing Notes and Old Medical Records    Provider Notes (Medical Decision Making):     70-year-old male presents emerged department with palpitations. Patient's blood pressure is elevated. Patient's initial EKG shows atrial fibrillation with rapid ventricular response. We will check screening labs. Will check chest x-ray. At outside hospital patient underwent echocardiogram and was given a dose of Xarelto p.o. Given patient failed oral medications. We will start patient on diltiazem drip. Will admit to hospitalist for cardiology consult and possible ROSLYN cardioversion. ED Course:   Initial assessment performed. The patients presenting problems have been discussed, and they are in agreement with the care plan formulated and outlined with them. I have encouraged them to ask questions as they arise throughout their visit. ED Course as of 11/19/22 2228   Sat Nov 19, 2022 2127 Preliminary EKG interpreted by me. Shows atrial fibrillation with a HR of 120. Left bundle branch block. [MB]   2214 Labs reviewed, mild leukocytosis likely reactive, CMP unremarkable without electrolyte abnormalities. Troponin is 8. BNP mildly elevated likely due to patient's heart rate. Chest x-ray reviewed and unremarkable. Patient on diltiazem drip. Discussed with hospitalist for admission. Nursing and myself attempted to contact [facility] for results of patient's echocardiogram without success x4.  [MB]      ED Course User Index  [MB] Basia Vicente MD     Critical Care Time:   CRITICAL CARE NOTE :    9:28 PM    IMPENDING DETERIORATION -Cardiovascular  ASSOCIATED RISK FACTORS - Dysrhythmia and Vascular Compromise  MANAGEMENT- Bedside Assessment  INTERPRETATION -  Xrays and ECG  INTERVENTIONS - hemodynamic mngmt  CASE REVIEW - Hospitalist/Intensivist, Nursing, and Family  TREATMENT RESPONSE -Improved  PERFORMED BY - Self    NOTES   :  I have spent 17 minutes of critical care time involved in lab review, consultations with specialist, family decision- making, bedside attention and documentation. This time excludes time spent in any separate billed procedures. During this entire length of time I was immediately available to the patient . Ever Tate MD      Disposition:    Admitted      Diagnosis     Clinical Impression:   1. Paroxysmal atrial fibrillation (HCC)    2. Palpitations        Attestations:    Ever Tate MD        Please note that this dictation was completed with [employer], the computer voice recognition software. Quite often unanticipated grammatical, syntax, homophones, and other interpretive errors are inadvertently transcribed by the computer software. Please disregard these errors. Please excuse any errors that have escaped final proofreading. Thank you.

## 2022-11-20 NOTE — H&P
This note is compiled to communicate with the medical care team. It may contain sensitive and protected information. It is not intended to serve as a source of communication with patients/families/friends; that communication occurs at the bedside or via alternative direct communications means (secure messaging, letters, video/telephone, etc.). Hospitalist Admission Note    NAME: Amy Spence   :  1965   MRN:  [id number]     Date/Time:  2022 10:29 PM    Patient PCP: None  ______________________________________________________________________  Given the patient's current clinical presentation, I have a high level of concern for decompensation if discharged from the emergency department. Complex decision making was performed, which includes reviewing the patient's available past medical records, laboratory results, and x-ray films. My assessment of this patient's clinical condition and my plan of care is as follows. Subjective:   CHIEF COMPLAINT: Atrial fibrillation with RVR    HISTORY OF PRESENT ILLNESS:     Sanjuana Doyle is a 62 y.o.  male with pertinent past medical history of atrial fibrillation status post ablation who presents with complaints of recurrent atrial fibrillation with RVR. Patient reports he has intermittently experienced brief periods of palpitations he believes are recurrent episodes of A. fib with RVR that always spontaneously resolve. He reports that this time he experienced palpitations 1 day prior not associated with chest pain or shortness of breath. He presented to [facility] where he was found to be in atrial fibrillation with RVR and was started on diltiazem with successful rate control and reinitiated on Xarelto with 1 dose given around 1600 today.   Unfortunately, he experienced prolonged wait in the emergency department and reports he feels that he was forgotten and his diltiazem drip was allowed to run out so he elected to leave. Other studies performed include TTE with bubble study, which I am unable to view at this time. Palpitations recurred prompting him to present to our facility for further evaluation and management. ROS otherwise negative. Patient reports no tobacco or illicit drug use. He does report daily alcohol consumption estimating 2 standard drinks of vodka per day. Patient reports he was previously followed by Dr. Paige Head in cardiology, but has not been seen in cardiology clinic in 4-5 years. He reports since that time he has stopped taking PTA medications with exception of daily baby aspirin. In the ED, patient afebrile, tachycardic with telemetry demonstrating atrial fibrillation with RVR-confirmed on ECG, hypertensive 140s/100s saturating mid to upper 90s on room air. 2 view chest radiographs demonstrate no acute pathology. Labs demonstrate: High sensitive troponin 8, proBNP 1637 (no priors), BMP remarkable for hyperglycemia of 163, CBC notable for WBC 12.1. Patient started on diltiazem drip with successful rate control at 5 mcg infusion rate. We were asked to admit for work up and evaluation of the above problems. Past Medical History:   Diagnosis Date    A-fib [facility])     CAD (coronary artery disease)     a fib    Hypertension     S/P ablation of atrial fibrillation 7/2/2015    Uncontrolled hypertension 4/21/2015        Past Surgical History:   Procedure Laterality Date    CARDIAC SURG PROCEDURE UNLIST      Cauterized parts of his heart    CARDIOVERSION, ELECTIVE  4/21/2015            Social History     Tobacco Use    Smoking status: Never    Smokeless tobacco: Never   Substance Use Topics    Alcohol use: Yes     Alcohol/week: 7.0 standard drinks     Types: 7 Glasses of wine per week     Comment: several drinks a day        Family History   Family history unknown: Yes       No Known Allergies     Prior to Admission medications    Medication Sig Start Date End Date Taking?  Authorizing Provider ASPIRIN/ACETAMINOPHEN (GOODY'S BODY PAIN PO) Take 1 Packet by mouth daily as needed for Pain. Yes Provider, Historical       REVIEW OF SYSTEMS:       Total of 12 systems reviewed as follows:       POSITIVE= Red text   General: Fever, chills, sweats, weakness/malaise, weight loss/gain, loss of appetite    Eyes:   Vision change, eye pain   ENT:    Rhinorrhea, pharyngitis, Hearing loss    Respiratory:   cough, sputum production, SOB, SALMON, wheezing, pleuritic pain    Cardiology:   chest pain, palpitations, orthopnea, edema, syncope    Gastrointestinal:  abdominal pain , N/V, diarrhea, dysphagia, constipation, bleeding    Genitourinary:  frequency, urgency, dysuria, hematuria, incontinence    Muskuloskeletal:  arthralgia, myalgia, back pain   Hematology:  easy bruising, nose or gum bleeding, lymphadenopathy    Dermatological: rash, ulceration, pruritis, color change / jaundice   Endocrine:  hot flashes or polydipsia    Neurological:  headache, dizziness, confusion, focal weakness, paresthesia, Speech difficulties, memory loss, gait difficulty   Psychological: Feelings of anxiety, depression, agitation       Objective:   VITALS:    Visit Vitals  BP (!) 209/169 (BP 1 Location: Left upper arm, BP Patient Position: Sitting)   Pulse 88   Temp 98.7 °F (37.1 °C)   Resp 14   Ht 5' 9\" (1.753 m)   Wt 83.7 kg (184 lb 8.4 oz)   SpO2 96%   BMI 27.25 kg/m²       PHYSICAL EXAM:    General:   Alert, cooperative, no distress, well-appearing middle-age  male        HEENT:  Atraumatic, anicteric sclerae, pink conjunctivae, mucosa moist, dentition fair     Neck:  Supple, symmetrical, trachea midline, no abnormalities on palpation     Lungs:   CTAB. Symmetric expansion. Good aeration. Normal respiratory effort. Chest wall:  No tenderness. No Accessory muscle use. Cardiovascular:   Irregularly irregular rate and rhythm, No murmur/rub/gallop. No JVD. Radial/AT/DP pulse 2+     GI/:   Soft, non-tender. Not distended. Bowel sounds normal. No HSM     Extremities No edema. No cyanosis. Capillary refill normal     Skin: No significant lesions noted. Not Jaundiced. No rashes      Neurologic: PERRL. EOMI. No facial asymmetry. No aphasia or slurred speech. Moves all extremities. Sensation to light touch grossly intact BUE/BLE. No overt focal deficits appreciated     Psych:  Alert and oriented X 4. Normal affect. Good insight     Other:   N/A         LAB DATA REVIEWED:    Recent Results (from the past 24 hour(s))   CBC WITH AUTOMATED DIFF    Collection Time: 11/19/22  8:50 PM   Result Value Ref Range    WBC 12.1 (H) 4.1 - 11.1 K/uL    RBC 4.99 4. 10 - 5.70 M/uL    HGB 15.9 12.1 - 17.0 g/dL    HCT 45.5 36.6 - 50.3 %    MCV 91.2 80.0 - 99.0 FL    MCH 31.9 26.0 - 34.0 PG    MCHC 34.9 30.0 - 36.5 g/dL    RDW 12.6 11.5 - 14.5 %    PLATELET 998 657 - 239 K/uL    MPV 10.5 8.9 - 12.9 FL    NRBC 0.0 0  WBC    ABSOLUTE NRBC 0.00 0.00 - 0.01 K/uL    NEUTROPHILS 55 32 - 75 %    LYMPHOCYTES 28 12 - 49 %    MONOCYTES 10 5 - 13 %    EOSINOPHILS 6 0 - 7 %    BASOPHILS 1 0 - 1 %    IMMATURE GRANULOCYTES 0 0.0 - 0.5 %    ABS. NEUTROPHILS 6.8 1.8 - 8.0 K/UL    ABS. LYMPHOCYTES 3.4 0.8 - 3.5 K/UL    ABS. MONOCYTES 1.2 (H) 0.0 - 1.0 K/UL    ABS. EOSINOPHILS 0.7 (H) 0.0 - 0.4 K/UL    ABS. BASOPHILS 0.1 0.0 - 0.1 K/UL    ABS. IMM. GRANS. 0.1 (H) 0.00 - 0.04 K/UL    DF AUTOMATED     METABOLIC PANEL, COMPREHENSIVE    Collection Time: 11/19/22  8:50 PM   Result Value Ref Range    Sodium 136 136 - 145 mmol/L    Potassium 3.7 3.5 - 5.1 mmol/L    Chloride 103 97 - 108 mmol/L    CO2 28 21 - 32 mmol/L    Anion gap 5 5 - 15 mmol/L    Glucose 163 (H) 65 - 100 mg/dL    BUN 17 6 - 20 MG/DL    Creatinine 0.99 0.70 - 1.30 MG/DL    BUN/Creatinine ratio 17 12 - 20      eGFR >60 >60 ml/min/1.73m2    Calcium 9.1 8.5 - 10.1 MG/DL    Bilirubin, total 0.6 0.2 - 1.0 MG/DL    ALT (SGPT) 32 12 - 78 U/L    AST (SGOT) 16 15 - 37 U/L    Alk.  phosphatase 65 45 - 117 U/L Protein, total 7.5 6.4 - 8.2 g/dL    Albumin 3.9 3.5 - 5.0 g/dL    Globulin 3.6 2.0 - 4.0 g/dL    A-G Ratio 1.1 1.1 - 2.2     TROPONIN-HIGH SENSITIVITY    Collection Time: 22  8:50 PM   Result Value Ref Range    Troponin-High Sensitivity 8 0 - 76 ng/L   NT-PRO BNP    Collection Time: 22  8:50 PM   Result Value Ref Range    NT pro-BNP 1,637 (H) <125 PG/ML       IMAGIN view chest radiographs-normal PA and lateral chest views.     EKG: A. fib with RVR, left axis deviation, LBBB morphology (appears new from 2017 ECG), rate 120, QTc 421  ______________________________________________________________________    Assessment / Plan:  Atrial fibrillation with rapid ventricular response  Left bundle branch block  History A. fib ablation no longer on anticoagulation  Essential hypertension  HLD  Elevated proBNP without evidence of volume overload  Daily alcohol consumption  Mild leukocytosis, suspect reactive    PLAN:    Atrial fibrillation with rapid ventricular response  Left bundle branch block  History A. fib ablation no longer on anticoagulation  Essential hypertension  HLD  Admit to stepdown unit  Cardiology consulted, greatly appreciate their expertise  -We will make n.p.o. at midnight in event cardiology wishes to pursue cardioversion  Xarelto given at OSH at approximately 1600 today, discussed anticoagulation with patient who is agreeable to resumption of Xarelto which she has tolerated well in the past 20 mg daily ordered to start at 1600 tomorrow  -Patient did voice interest in watchman procedure-directed him to discuss this with cardiology to determine candidacy and appropriateness  Rate well controlled at 5 on diltiazem drip, will attempt to transition to p.o.  -60 mg diltiazem every 6 hours-nursing communication directing them to down titrate treatment by 2.5 every 30 minutes starting 1 hour after p.o. medication administered with goal to discontinue drip as long as heart rate remains less than 90  Check hemoglobin A1c and lipids  Continue PTA baby aspirin  Monitor blood pressure-diltiazem does have antihypertensive effect, which may be adequate to provide sufficient control  Consider initiation of statin, defer to cardiology  Check magnesium and replete as needed    Elevated proBNP without evidence of volume overload  TTE reportedly performed at [facility]  -ED provider requesting records    Daily alcohol consumption  Patient reporting drinking within daily recommended limits 1-2 alcoholic beverages per day for adult male  -Briefly discussed that alcohol consumption is cardiotoxic and does promote atrial fibrillation and would recommend cutting back/total abstinence to optimize chances of rate/rhythm control    Mild leukocytosis, suspect reactive  Suspect this is demarginalization secondary to stress response from persistent elevated heart rate. Recheck CBC in the morning. Code Status: Full  Emergency contact:   Spouse    DVT Prophylaxis: Xarelto  GI Prophylaxis: N/A  Diet: NPO at midnight       Care Plan discussed with:    Comments   Patient x    Family  x Wife at bedside   RN     Care Manager                    Consultant:      _______________________________________________________________________  Baseline Level of Function: independent    Expected  Disposition:   Home with Family x   HH/PT/OT/RN    SNF/LTC    [facility]ELO    ________________________________________________________________________  TOTAL TIME:  76 Minutes      Comments    x Reviewed previous records   >50% of visit spent in counseling and coordination of care x Discussion with patient and/or family and questions answered       ________________________________________________________________________  Signed: Amado Infante DO  11/19/2022 10:29 PM    Please note that this documentation was completed in part with Dragon dictation software.  Occasionally unanticipated grammatical, syntax, homophones, and other interpretive errors are inadvertently transcribed by the computer software. Please excuse and disregard any errors that have escaped final proofreading. If in doubt, please do not hesitate to reach out to myself or the assigned hospitalist via [facility] paging system for clarification.

## 2022-11-20 NOTE — PROGRESS NOTES
Bedside shift change report given to Gerard Green RN (oncoming nurse) by Demarcus Guerrero RN (offgoing nurse). Report included the following information SBAR, Kardex, ED Summary, Intake/Output, MAR, and Cardiac Rhythm afib . HIMA Hurt will be charting on this patient. 0800: called cardiology consult to Anita Martinez- Dr. Joleen Lozano is on call and will see patient. 1030: Called cardiology office to ensure provider will see patient today.

## 2022-11-20 NOTE — ED NOTES
2346: Decreased diltiazem drip to 2.5 mg/hr per MD.    0020: Stopped diltiazem drip per MD. See flowsheets for VS.

## 2022-11-20 NOTE — PROGRESS NOTES
0700: Bedside shift change report given to Mayo Parsons (oncoming nurse) by Tiffanie Reyes RN (offgoing nurse). Report included the following information SBAR, Kardex, ED Summary, MAR, Recent Results, and Cardiac Rhythm Afib .

## 2022-11-20 NOTE — PROGRESS NOTES
TRANSFER - IN REPORT:    Verbal report received from HIMA Hansen (name) on Amy Levels  being received from ED (unit) for routine progression of care      Report consisted of patients Situation, Background, Assessment and   Recommendations(SBAR). Information from the following report(s) SBAR, Kardex, ED Summary, Intake/Output, MAR, Recent Results, and Cardiac Rhythm Afib  was reviewed with the receiving nurse. Opportunity for questions and clarification was provided. Assessment completed upon patients arrival to unit and care assumed. End of Shift Note    Bedside shift change report given to Doreen Rich RN (oncoming nurse) by Sarah England RN (offgoing nurse). Report included the following information SBAR, Kardex, Intake/Output, MAR, Recent Results, and Cardiac Rhythm Afib    Shift worked:  1900 - 0700     Shift summary and any significant changes:     Pt admitted to unit. Remains in rate controlled afib. NPO since 0000. Concerns for physician to address:       Zone phone for oncoming shift:          Activity:  Activity Level: Up with Assistance  Number times ambulated in hallways past shift: 0  Number of times OOB to chair past shift: 0    Cardiac:   Cardiac Monitoring: Yes      Cardiac Rhythm: Atrial Fib    Access:  Current line(s): PIV     Genitourinary:   Urinary status: voiding    Respiratory:   O2 Device: None (Room air)  Chronic home O2 use?: NO  Incentive spirometer at bedside: N/A       GI:  Last Bowel Movement Date: 11/18/22  Current diet:  DIET NPO  Passing flatus: YES  Tolerating current diet: YES       Pain Management:   Patient states pain is manageable on current regimen: YES    Skin:  Marcin Score: 23  Interventions: increase time out of bed and nutritional support     Patient Safety:  Fall Score:  Total Score: 1  Interventions: bed/chair alarm, gripper socks, and pt to call before getting OOB       Length of Stay:  Expected LOS: - - -  Actual LOS: [address], RN

## 2022-11-20 NOTE — DISCHARGE SUMMARY
Hospitalist Discharge Summary     Patient ID:  Guadalupe Bowman  [id number]  1 y.o.  1965 11/19/2022    PCP on record: None    Admit date: 11/19/2022  Discharge date and time: 11/20/2022    DISCHARGE DIAGNOSIS:    A. fib with RVR    CONSULTATIONS:  IP CONSULT TO HOSPITALIST  IP CONSULT TO CARDIOLOGY    Excerpted HPI from H&P of Aleah Ding, DO:  Sancho Hunt is a 62 y.o.  male with pertinent past medical history of atrial fibrillation status post ablation who presents with complaints of recurrent atrial fibrillation with RVR. Patient reports he has intermittently experienced brief periods of palpitations he believes are recurrent episodes of A. fib with RVR that always spontaneously resolve. He reports that this time he experienced palpitations 1 day prior not associated with chest pain or shortness of breath. He presented to [facility] where he was found to be in atrial fibrillation with RVR and was started on diltiazem with successful rate control and reinitiated on Xarelto with 1 dose given around 1600 today. Unfortunately, he experienced prolonged wait in the emergency department and reports he feels that he was forgotten and his diltiazem drip was allowed to run out so he elected to leave. Other studies performed include TTE with bubble study, which I am unable to view at this time. Palpitations recurred prompting him to present to our facility for further evaluation and management. ROS otherwise negative. Patient reports no tobacco or illicit drug use. He does report daily alcohol consumption estimating 2 standard drinks of vodka per day. Patient reports he was previously followed by Dr. Lavera Cabot in cardiology, but has not been seen in cardiology clinic in 4-5 years. He reports since that time he has stopped taking PTA medications with exception of daily baby aspirin.      In the ED, patient afebrile, tachycardic with telemetry demonstrating atrial fibrillation with RVR-confirmed on ECG, hypertensive 140s/100s saturating mid to upper 90s on room air. 2 view chest radiographs demonstrate no acute pathology. Labs demonstrate: High sensitive troponin 8, proBNP 1637 (no priors), BMP remarkable for hyperglycemia of 163, CBC notable for WBC 12.1. Patient started on diltiazem drip with successful rate control at 5 mcg infusion rate.    ______________________________________________________________________  DISCHARGE SUMMARY/HOSPITAL COURSE:  for full details see H&P, daily progress notes, labs, consult notes. The patient is 62years old man with past medical history significant for paroxysmal A. fib status post ablation in the past was admitted to the hospital due to A. fib with RVR. He was started initially on Cardizem drip. Cardiology evaluated patient, they switch him to long-acting Cardizem drip and Xarelto and follow-up as an outpatient with Dr. Maria Vargas.  On the day of discharge patient was hemodynamically stable. His wife was at bedside. I advised him to follow-up with PCP/pulmonology to eval for obstructive sleep apnea. Patient's wife was at bedside, all questions were answered. _______________________________________________________________________  Patient seen and examined by me on discharge day. Pertinent Findings:    GEN: awake, alert, non-diaphoretic, no psychomotor agitation, no acute distress  HEENT: Atraumatic, normocephalic, no rashes noted, no facial lesions noted. Eyes: No redness, no discharge, no swelling. No drainage observed from nose. CARDIOPULMONARY: no increased respiratory effort, speaking in clear sentences, I:E ratio WNL  GI: Abdomen does not appear to be distended  MSK: normal ROM in the neck, upper extremities and lower extremities  SKIN: no lesions, wounds, erythema, or cyanosis noted on face or hands  NEURO: EOMs intact. No facial asymmetry. No aphasia or slurred speech.  Symmetrical strength, Sensation grossly intact. Alert and oriented X 4. Pupils are reactive bilaterally. PSYCH: appearance, behavior, and attitude - well groomed, pleasant, cooperative    _______________________________________________________________________  DISCHARGE MEDICATIONS:   Current Discharge Medication List        START taking these medications    Details   dilTIAZem IR (CARDIZEM) 60 mg tablet Take 1 Tablet by mouth every six (6) hours. Qty: 180 Tablet, Refills: 0  Start date: 11/20/2022      rivaroxaban (XARELTO) 20 mg tab tablet Take 1 Tablet by mouth daily. Qty: 30 Tablet, Refills: 0  Start date: 11/20/2022           STOP taking these medications       ASPIRIN/ACETAMINOPHEN (GOODY'S BODY PAIN PO) Comments:   Reason for Stopping:                 Patient Follow Up Instructions:    Activity: Activity as tolerated  Diet: Resume previous diet      Follow-up Information       Follow up With Specialties Details Why Contact Info    None    None ([phone]) Patient stated that they have no PCP            ________________________________________________________________    Risk of deterioration: Low    Condition at Discharge:  Stable  __________________________________________________________________    Disposition  Home with family, no needs    ____________________________________________________________________    Code Status: Full Code  ___________________________________________________________________      Total time in minutes spent coordinating this discharge (includes going over instructions, follow-up, prescriptions, and preparing report for sign off to her PCP) :  >30 minutes    Signed:  Alisha Pavon MD

## 2022-11-20 NOTE — CONSULTS
[facility]  [address]  [phone]  [email or web address]. [email or web address]       CARDIOLOGY CONSULTATION       Date of  Admission: 11/19/2022  8:49 PM     Admission type:Emergency   Primary Care Physician:None     Attending Provider: Seamus Cruz MD  Cardiology Provider:     PLEASE NOTE THAT WE CONFIRMED WITH THE REFERRING PHYSICIAN PRIOR TO SEEING THE PATIENT THAT THE PATIENT IS BEING REFERRED [address] [place] [address] EVALUATION AND FOR LONG-TERM ONGOING CARDIAC CARE    CC/REASON FOR CONSULT: AF     Subjective:     Monique Nix is a 62 y.o. male admitted for Atrial fibrillation with RVR ([address] [I48.91]. Long Hx PAF with ablation 5 yr ago. Rare brief episodes until 48 hr ago. Has been back in AF since Friday. Normal coronaries by cath previously. Now rate controlled and asymptomatic      Patient Active Problem List    Diagnosis Date Noted    Atrial fibrillation with RVR ([address] 11/19/2022    Mixed hyperlipidemia 01/09/2018    LBBB (left bundle branch block) 10/17/2017    S/P ablation of atrial fibrillation 07/02/2015    Uncontrolled hypertension 04/21/2015    Atrial fibrillation with rapid ventricular response ([address] 04/20/2015      None  Past Medical History:   Diagnosis Date    A-fib [facility])     CAD (coronary artery disease)     a fib    Hypertension     S/P ablation of atrial fibrillation 7/2/2015    Uncontrolled hypertension 4/21/2015      Social History     Socioeconomic History    Marital status:    Tobacco Use    Smoking status: Never    Smokeless tobacco: Never   Substance and Sexual Activity    Alcohol use:  Yes     Alcohol/week: 7.0 standard drinks     Types: 7 Glasses of wine per week     Comment: several drinks a day    Drug use: No     No Known Allergies   Family History   Family history unknown: Yes      Current Facility-Administered Medications   Medication Dose Route Frequency    dilTIAZem (CARDIZEM) 100 mg in dextrose 5% (MBP/ADV) 100 mL infusion  0-15 mg/hr IntraVENous TITRATE    sodium chloride (NS) flush 5-40 mL  5-40 mL IntraVENous Q8H    sodium chloride (NS) flush 5-40 mL  5-40 mL IntraVENous PRN    acetaminophen (TYLENOL) tablet 650 mg  650 mg Oral Q6H PRN    Or    acetaminophen (TYLENOL) suppository 650 mg  650 mg Rectal Q6H PRN    polyethylene glycol (MIRALAX) packet 17 g  17 g Oral DAILY PRN    ondansetron (ZOFRAN ODT) tablet 4 mg  4 mg Oral Q8H PRN    Or    ondansetron (ZOFRAN) injection 4 mg  4 mg IntraVENous Q6H PRN    rivaroxaban (XARELTO) tablet 20 mg  20 mg Oral DAILY    dilTIAZem IR (CARDIZEM) tablet 60 mg  60 mg Oral Q6H        Review of Symptoms:     Constitutional: Negative for chills, fever and weight loss or excessive fatigue  HENT: Negative for nosebleeds, difficulty swallowing or tissue swelling   Eyes: Negative for blurred vision, double vision, occular pain  Respiratory: Negative for cough, shortness of breath , wheezing. Gastrointestinal: Negative for abdominal pain, nausea/vomiting, blood in stool. Cardiovascular: Negative for chest pain, palpitations, orthopnea, leg swelling and PND. Skin: Negative for rash, persistent diaphoresis, persistent pruritis  Neurological: Negative for dizziness, loss of consciousness, slurred speech, headache. Psychiatric: Negative for significant anxiety, memory disturbance, change in mood. Objective:      Visit Vitals  BP (!) 148/79   Pulse 93   Temp 98.2 °F (36.8 °C)   Resp 15   Ht 5' 9\" (1.753 m)   Wt 184 lb 8.4 oz (83.7 kg)   SpO2 97%   BMI 27.25 kg/m²       Physical Exam     General: Well developed, in no acute distress, cooperative and alert  HEENT: No carotid bruits, no JVD, trach is midline. Neck Supple, PERRL, EOM intact. Heart:  Normal S1/S2 negative S3 or S4. Regular, no murmur, gallop or rub. Respiratory: Clear bilaterally x 4, no wheezing or rales  Abdomen:   Soft, non-tender, no masses, bowel sounds are active.    Extremities:  No edema, normal cap refill, no cyanosis, atraumatic. Neuro: A&Ox3, speech clear, gait stable. Skin: Skin color is normal. No rashes or lesions. Non diaphoretic  Vascular: 2+ pulses symmetric in all extremities    Data Review:   Recent Labs     11/20/22 0106 11/19/22 2050   WBC 10.3 12.1*   HGB 15.3 15.9   HCT 45.2 45.5    337     Recent Labs     11/20/22 0106 11/19/22 2050    136   K 3.6 3.7    103   CO2 26 28   * 163*   BUN 18 17   CREA 0.84 0.99   CA 9.0 9.1   MG  --  2.2   ALB  --  3.9   TBILI  --  0.6   ALT  --  32     No results for input(s): CPK, CKMB, TROIQ in the last 72 hours. No lab exists for component: CKQMB, CPKMB, BMPP  No results for input(s): TROIQ, CPK, CKMB in the last 72 hours. Intake/Output Summary (Last 24 hours) at 11/20/2022 1147  Last data filed at 11/20/2022 0800  Gross per 24 hour   Intake --   Output 200 ml   Net -200 ml        Cardiographics    Telemetry: rate controlled AF  ECG: AF  Echocardiogram: Not done  CXRAY:       Assessment:       Active Problems:    Atrial fibrillation with RVR ([address] (11/19/2022)         Plan:     Rate now controlled on po dilt. Would switch him to long acting and DC him today on dilt + xarelto.   F/U with Dr Praveena Padron to discuss Kaylee Vazquez MD  CC:None

## 2022-11-20 NOTE — ED NOTES
Attempted to call [facility] x3 for patient medical records. No answer x2, Then placed on hold for 20 minutes on third atempt.

## 2022-12-11 NOTE — PROGRESS NOTES
Physician Progress Note      PATIENT:               Fili Moncada  CSN #:                  [id number]  :                       1965  ADMIT DATE:       2022 8:49 PM  [address] [place] DATE:        2022 3:00 PM  RESPONDING  PROVIDER #:        Zen Mcnair MD          QUERY TEXT:    Patient admitted with tachycardia, noted to have atrial fibrillation and is maintained on Xarelto. Please document in progress notes and discharge summary if you are evaluating and/or treating any of the following: The medical record reflects the following:  Risk Factors: A-fib    Clinical Indicators:   H&P: Patient admitted with atrial fibrillation status post ablation who presents with complaints of recurrent atrial fibrillation.  Assessment: Atrial fibrillation with rapid ventricular response   Consult Notes : Atrial fibrillation with RVR   Discharge Summary: Discharge Diagnosis: A. fib with RVR   Hospital Course: Cardiology evaluated patient, they switch him to long-acting Cardizem drip and Xarelto    Treatment: Xarelto and Cardiology Consult  Options provided:  -- Secondary hypercoagulable state in a patient with atrial fibrillation  -- Other - I will add my own diagnosis  -- Disagree - Not applicable / Not valid  -- Disagree - Clinically unable to determine / Unknown  -- Refer to Clinical Documentation Reviewer    PROVIDER RESPONSE TEXT:    This patient has secondary hypercoagulable state in a patient with atrial fibrillation.     Query created by: Renea Robbins on 2022 4:38 PM      Electronically signed by:  Zen Mcnair MD 12/10/2022 10:10 PM